# Patient Record
Sex: MALE | Race: WHITE | Employment: FULL TIME | URBAN - NONMETROPOLITAN AREA
[De-identification: names, ages, dates, MRNs, and addresses within clinical notes are randomized per-mention and may not be internally consistent; named-entity substitution may affect disease eponyms.]

---

## 2021-06-09 ENCOUNTER — HOSPITAL ENCOUNTER (EMERGENCY)
Age: 26
Discharge: HOME | End: 2021-06-09
Payer: OTHER GOVERNMENT

## 2021-06-09 VITALS
BODY MASS INDEX: 27.28 KG/M2 | DIASTOLIC BLOOD PRESSURE: 79 MMHG | HEART RATE: 73 BPM | TEMPERATURE: 97.2 F | HEIGHT: 68 IN | OXYGEN SATURATION: 94 % | SYSTOLIC BLOOD PRESSURE: 130 MMHG | RESPIRATION RATE: 16 BRPM | WEIGHT: 180 LBS

## 2021-06-09 DIAGNOSIS — S61.219A LACERATION OF FINGER OF LEFT HAND WITHOUT DAMAGE TO NAIL, FOREIGN BODY PRESENCE UNSPECIFIED, UNSPECIFIED FINGER, INITIAL ENCOUNTER: Primary | ICD-10-CM

## 2021-06-09 PROCEDURE — 2500000003 HC RX 250 WO HCPCS: Performed by: EMERGENCY MEDICINE

## 2021-06-09 PROCEDURE — 6370000000 HC RX 637 (ALT 250 FOR IP): Performed by: EMERGENCY MEDICINE

## 2021-06-09 PROCEDURE — 99203 OFFICE O/P NEW LOW 30 MIN: CPT

## 2021-06-09 PROCEDURE — 99203 OFFICE O/P NEW LOW 30 MIN: CPT | Performed by: EMERGENCY MEDICINE

## 2021-06-09 RX ORDER — LIDOCAINE HYDROCHLORIDE 10 MG/ML
5 INJECTION, SOLUTION INFILTRATION; PERINEURAL ONCE
Status: COMPLETED | OUTPATIENT
Start: 2021-06-09 | End: 2021-06-09

## 2021-06-09 RX ORDER — DIAPER,BRIEF,INFANT-TODD,DISP
EACH MISCELLANEOUS ONCE
Status: COMPLETED | OUTPATIENT
Start: 2021-06-09 | End: 2021-06-09

## 2021-06-09 RX ADMIN — BACITRACIN ZINC: 500 OINTMENT TOPICAL at 11:11

## 2021-06-09 RX ADMIN — LIDOCAINE HYDROCHLORIDE 5 ML: 10 INJECTION, SOLUTION INFILTRATION; PERINEURAL at 11:15

## 2021-06-09 ASSESSMENT — PAIN DESCRIPTION - LOCATION: LOCATION: FINGER (COMMENT WHICH ONE)

## 2021-06-09 ASSESSMENT — PAIN DESCRIPTION - PROGRESSION: CLINICAL_PROGRESSION: NOT CHANGED

## 2021-06-09 ASSESSMENT — PAIN SCALES - GENERAL: PAINLEVEL_OUTOF10: 3

## 2021-06-09 ASSESSMENT — ENCOUNTER SYMPTOMS
COUGH: 0
SHORTNESS OF BREATH: 0

## 2021-06-09 ASSESSMENT — PAIN DESCRIPTION - ORIENTATION: ORIENTATION: LEFT

## 2021-06-09 ASSESSMENT — PAIN DESCRIPTION - FREQUENCY: FREQUENCY: CONTINUOUS

## 2021-06-09 ASSESSMENT — PAIN DESCRIPTION - PAIN TYPE: TYPE: ACUTE PAIN

## 2021-06-09 ASSESSMENT — PAIN DESCRIPTION - ONSET: ONSET: SUDDEN

## 2021-06-09 ASSESSMENT — PAIN DESCRIPTION - DESCRIPTORS: DESCRIPTORS: ACHING

## 2021-06-09 NOTE — ED PROVIDER NOTES
[place] [address]  Urgent Care Encounter       CHIEF COMPLAINT       Chief Complaint   Patient presents with    Hand Laceration       Nurses Notes reviewed and I agree except as noted in the HPI. HISTORY OF PRESENT ILLNESS   Lloyd Crenshaw is a 31 y.o. male who presents for complaints of laceration to left little finger. Patient states he was working on a transmission of the vehicle. He states his wrench slipped and he cut his finger on a piece of metal on the underside of the engine. Bleeding is currently under control. Patient states last tetanus shot was 1 year ago. Reports he has full range of motion of his finger    HPI    REVIEW OF SYSTEMS     Review of Systems   Constitutional: Negative for fatigue and fever. Respiratory: Negative for cough and shortness of breath. Musculoskeletal: Negative for arthralgias. Skin: Positive for wound (laceration left little finger). Psychiatric/Behavioral: Negative for behavioral problems. PAST MEDICAL HISTORY   History reviewed. No pertinent past medical history. SURGICALHISTORY     Patient  has a past surgical history that includes LASIK. CURRENT MEDICATIONS       Previous Medications    No medications on file       ALLERGIES     Patient is has No Known Allergies. Patients   There is no immunization history on file for this patient. FAMILY HISTORY     Patient's family history includes Heart Attack in his mother. SOCIAL HISTORY     Patient  reports that he has never smoked. He has never used smokeless tobacco. He reports current alcohol use. He reports that he does not use drugs. PHYSICAL EXAM     ED TRIAGE VITALS  BP: 130/79, Temp: 97.2 °F (36.2 °C), Pulse: 73, Resp: 16, SpO2: 94 %,Estimated body mass index is 27.37 kg/m² as calculated from the following:    Height as of this encounter: 5' 8\" (1.727 m). Weight as of this encounter: 180 lb (81.6 kg). ,No LMP for male patient.     Physical Exam  Constitutional: General: He is not in acute distress. Appearance: He is normal weight. He is not ill-appearing. Pulmonary:      Effort: Pulmonary effort is normal.   Musculoskeletal:      Left hand: Laceration present. Hands:    Skin:     General: Skin is warm and dry. Neurological:      General: No focal deficit present. Mental Status: He is alert. Psychiatric:         Mood and Affect: Mood normal.         Behavior: Behavior normal.         DIAGNOSTIC RESULTS     Labs:No results found for this visit on 06/09/21. IMAGING:    No orders to display         EKG:      URGENT CARE COURSE:     Vitals:    06/09/21 1018   BP: 130/79   Pulse: 73   Resp: 16   Temp: 97.2 °F (36.2 °C)   TempSrc: Temporal   SpO2: 94%   Weight: 180 lb (81.6 kg)   Height: 5' 8\" (1.727 m)       Medications   lidocaine 1 % injection 5 mL (5 mLs Intradermal Given 6/9/21 1115)   bacitracin zinc ointment ( Topical Given 6/9/21 1111)            PROCEDURES:  Lac Repair    Date/Time: 6/9/2021 11:15 AM  Performed by: ZAINAB Wadsworth CNP  Authorized by: ZAINAB Wadsworth CNP     Consent:     Consent obtained:  Verbal    Consent given by:  Patient    Risks discussed:  Infection, need for additional repair, nerve damage, pain, poor cosmetic result, poor wound healing, retained foreign body, tendon damage and vascular damage    Alternatives discussed:  No treatment  Anesthesia (see MAR for exact dosages):      Anesthesia method:  Local infiltration    Local anesthetic:  Lidocaine 1% w/o epi  Laceration details:     Location:  Finger    Finger location:  L small finger    Length (cm):  2  Repair type:     Repair type:  Simple  Pre-procedure details:     Preparation:  Patient was prepped and draped in usual sterile fashion  Exploration:     Hemostasis achieved with:  Tourniquet    Wound exploration: wound explored through full range of motion and entire depth of wound probed and visualized      Wound extent: no foreign bodies/material noted, no muscle damage noted, no nerve damage noted, no tendon damage noted, no underlying fracture noted and no vascular damage noted      Contaminated: no    Treatment:     Area cleansed with:  Quirino-Clens    Amount of cleaning:  Standard    Visualized foreign bodies/material removed: no    Skin repair:     Repair method:  Sutures    Suture size:  4-0    Suture material:  Nylon    Suture technique:  Simple interrupted    Number of sutures:  4  Approximation:     Approximation:  Close  Post-procedure details:     Dressing:  Antibiotic ointment and non-adherent dressing    Patient tolerance of procedure: Tolerated well, no immediate complications        FINAL IMPRESSION      1. Laceration of finger of left hand without damage to nail, foreign body presence unspecified, unspecified finger, initial encounter          DISPOSITION/ PLAN     The patient has finger laceration. Repaired with simple interrupted sutures. No apparent tendon involvement. Sensation and cap refill intact. Patient will be discharged. Advised to wash hands couple times a day mild soap and water. Pat dry, apply antibiotic ointment. Sutures out 10 to 14 days. Follow-up with [facility] or return here. Advised to return for increased redness, swelling, drainage or any new concerns. PATIENT REFERRED TO:  No primary care provider on file. No primary physician on file. DISCHARGE MEDICATIONS:  New Prescriptions    No medications on file       Discontinued Medications    No medications on file       There are no discharge medications for this patient.       Adrian Skiff, APRN - CNP    (Please note that portions of this note were completed with a voice recognition program. Efforts were made to edit the dictations but occasionally words are mis-transcribed.)          Adrian Skiff, APRN - CNP  06/09/21 6214

## 2021-06-09 NOTE — ED NOTES
Pt discharged. Pt verbalized understanding of discharge instructions. Pt walked out per self in stable condition.      Amarilis Osullivan, MARYLU  89/92/14 [id number]

## 2021-06-09 NOTE — ED TRIAGE NOTES
To room with c/o laceration to left 5 th digit during class at [facility]. He was cut by transmission. No active bleeding. Skin flap covering laceration.

## 2021-09-13 ENCOUNTER — HOSPITAL ENCOUNTER (OUTPATIENT)
Age: 26
Discharge: HOME | End: 2021-09-13
Payer: OTHER GOVERNMENT

## 2021-09-13 LAB — SOURCE: NORMAL

## 2021-09-13 PROCEDURE — 87636 SARSCOV2 & INF A&B AMP PRB: CPT

## 2021-09-13 PROCEDURE — U0003 INFECTIOUS AGENT DETECTION BY NUCLEIC ACID (DNA OR RNA); SEVERE ACUTE RESPIRATORY SYNDROME CORONAVIRUS 2 (SARS-COV-2) (CORONAVIRUS DISEASE [COVID-19]), AMPLIFIED PROBE TECHNIQUE, MAKING USE OF HIGH THROUGHPUT TECHNOLOGIES AS DESCRIBED BY CMS-2020-01-R: HCPCS

## 2021-09-14 LAB
INFLUENZA A: NOT DETECTED
INFLUENZA B: NOT DETECTED
SARS-COV-2 RNA, RT PCR: NOT DETECTED

## 2022-05-27 ENCOUNTER — HOSPITAL ENCOUNTER (OUTPATIENT)
Age: 27
Discharge: HOME | End: 2022-05-27
Payer: OTHER GOVERNMENT

## 2022-05-27 ENCOUNTER — HOSPITAL ENCOUNTER (OUTPATIENT)
Dept: GENERAL RADIOLOGY | Age: 27
Discharge: HOME | End: 2022-05-27
Payer: OTHER GOVERNMENT

## 2022-05-27 DIAGNOSIS — M25.569 KNEE PAIN, UNSPECIFIED CHRONICITY, UNSPECIFIED LATERALITY: ICD-10-CM

## 2022-05-27 PROCEDURE — 73562 X-RAY EXAM OF KNEE 3: CPT

## 2022-06-16 ENCOUNTER — HOSPITAL ENCOUNTER (OUTPATIENT)
Dept: PHYSICAL THERAPY | Age: 27
Setting detail: THERAPIES SERIES
Discharge: HOME | End: 2022-06-16
Payer: OTHER GOVERNMENT

## 2022-06-16 PROCEDURE — 97530 THERAPEUTIC ACTIVITIES: CPT

## 2022-06-16 PROCEDURE — 97162 PT EVAL MOD COMPLEX 30 MIN: CPT

## 2022-06-16 NOTE — PROGRESS NOTES
** PLEASE SIGN, DATE AND TIME CERTIFICATION BELOW AND RETURN TO [facility] OUTPATIENT REHABILITATION (FAX #: [phone]). ATTEST/CO-SIGN IF ACCESSING VIA [employer]. THANK YOU.**    I certify that I have examined the patient below and determined that Physical Medicine and Rehabilitation service is necessary and that I approve the established plan of care for up to 90 days or as specifically noted. Attestation, signature or co-signature of physician indicates approval of certification requirements.    ________________________ ____________ __________  Physician Signature   Date   Time  [address] [facility]  PHYSICAL THERAPY  [x] EVALUATION  [] DAILY NOTE (LAND) [] DAILY NOTE (AQUATIC ) [] PROGRESS NOTE [] DISCHARGE NOTE    [x] [address]   [] [place] [address]    [] [facility]   [] [facility]    Date: 2022  Patient Name:  Zaki Hager  : 1995  MRN: [id number]  CSN: [id number]    Referring Practitioner Aisha    Diagnosis Pain in unspecified knee [M25.569]    Treatment Diagnosis B knee pain, B knee weakness, B knee stiffness, abnormal gait   Date of Evaluation 22    Additional Pertinent History Denies significant past medical history      Functional Outcome Measure Used LEFS   Functional Outcome Score 48/80 (22)       Insurance: Primary: Payor: Sid Esparza /  /  / ,   Secondary:    Authorization Information:  YES APPROVED FOR PT   Visit # 1/15, 1/10 for progress note   Visits Allowed: TOTAL OF 15 VISITS  FROM 22 TO 15/20/31,   Recertification Date: 2022   Physician Follow-Up:    Physician Orders: eval and treat   History of Present Illness: Pt is a 32 y/o male presenting to skilled PT services with c/o B knee pain. 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 Pain has been worseining within the last month or two.  Did have x-rays completed of both knees which were normal.      SUBJECTIVE: Pt not checked in until 30 min after arrival, but very understanding of miscommunication. Pt reports 2-3/10 B knee pain today, increases with activity up to 6-7/10. Social/Functional History and Current Status:  Medications and Allergies have been reviewed and are listed on Medical History Questionnaire. Rashida Kenney lives with a roommate in a single story home with a level surface to enter. Task Previous Current   ADLs  Independent Modified Independent   IADL's Independent Modified Independent   Ambulation Independent Modified Independent   Transfers Independent Modified Independent   Recreation Independent Modified Independent   Community Integration Independent Modified Independent   Driving Active  Active    Work Part-Time. Occupation:   Off Work Due to Injury. OBJECTIVE:    OBSERVATION    Comments   Pain 2-3/10 B knees   Posture Forward head, rounded shoulders   Palpation TTP superior patella B   Sensation B LE light touch sensation intact   Observation    Patellar Mobility Decreased mobility in sup/inf on L       LOWER EXTREMITY RANGE OF MOTION    Left Right Comments   Hip Flexion      Hip Extension      Hip ABDuction      Hip ADDuction      Hip Internal Rotation      Hip External Rotation      Hip Range of Motion is GLEN/[facility] [place]  [x]      Knee Flexion      Knee Extension Lacking 9 deg Lacking 4 deg    Knee Range of Motion is GLEN/[facility] [place]  []      Ankle Plantarflexion      Ankle Dorsiflexion      Ankle Inversion      Ankle Eversion      Ankle Range of Motion is [facility] [place]  [x]       LOWER EXTREMITY STRENGTH    Left Right Comments   Hip Flexion 4+ 4+    Hip Abduction      Hip Extension      Hip Adduction      Knee Flexion 4+ 4+    Knee Extension 4 4    Ankle DF 5 5    Ankle PF      Ankle Inversion      Ankle Eversion      LE strength is [place]/[facility] [place] []    SPECIAL TESTS (+/-)    Left Right Comments   Ant.  Drawer - -    Lachman's      Post. Drawer - -    Valgus Stress - -    Varus Stress - -    Yanet      Apley Beverli Peyer + +    90/90 hamstring length      Rafiq - -      GAIT ANALYSIS: no AD, pt demonstrates increased foot pronation and arch collapse, decreased stance time on L, decreased heel strike B, and decreased TKE    BALANCE/PROPRIOCEPTION          Single leg stance                                                      Left Right Comments   Eyes open                                 15+       Sec      15+   Sec painful       FUNCTIONAL TESTS    Pain No Pain Comments   Stair navigation x     Squat x  Quad dominat    Sit to Stand x         TREATMENT   Precautions:    Pain: 2-3/10 B knees    \"X in shaded column indicates activity completed today    *\" next to exercise/intervention indicates progression   Modalities Parameters/  Location  Notes                     Manual Therapy Time/Technique  Notes                     Exercise/Intervention   Notes   Bike              quad set with towel post to knee 10x5 sec  x    SLR 10x  x           HS stretch seated 3x30 sec  x    gastroc stretch with strap 3x30 sec  x    Seated quad stretch under chair 3x30 sec  x                         Gait education   x Heel toe pattern, knee ext, shoe orthotics     Specific Interventions Next Treatment: bike warmup to promote knee ext, B LE strengthening (TKE), LE stretching (quads, gastroc/solues, HS), gait training (TKE, heel strike), possible shoe insert for arch support, body mechanics, L patellar mobs, modalities PRN     Activity/Treatment Tolerance:  [x]  Patient tolerated treatment well  []  Patient limited by fatigue  []  Patient limited by pain   []  Patient limited by medical complications  []  Other:     Assessment:  Pt is a 30 y/o male presenting to skilled PT services with c/o B knee pain. Pt demonstrates B knee weakness, B knee stiffness, and abnormal gait, limiting his ability to ADLs and household tasks.  Pt will benefit from skilled PT services to increase strength, ROM, reduce pain, and improve overall gait mechanics to improve ability to perform functional mobility tasks for return to PLOF. Body Structures/Functions/Activity Limitations: impaired activity tolerance, impaired balance, impaired ROM, impaired strength, pain and abnormal gait  Prognosis: good    GOALS:  Patient Goal: reduce pain    Short Term Goals:  Time Frame: 3 weeks    1. Patient will report decrease in pain to 3/10 at most to allow ease of ADL's and household tasks. 2. Patient will increase R knee ext AROM to lacking 4 degrees to improve ability to ambulate with a normalized gait pattern. 3. Patient will increase B knee ext strength to 4+/5 to promote ease of ambulating with a normalized gait pattern. 4. Patient will ambulate with community distances without and AD while demonstrating B TKE, heel strike, and equal stance time to allow improved overall gait mechanics. 6. Patient will be indep with HEP in order to meet long term goals. Long Term Goals:  Time Frame: 6 weeks    1. Patient will be indep with HEP in order to prevent re-injury and improve ability to perform functional mobility tasks. 2. Patient will improve LEFS score from 48/80 to 55/80 to promote improved functional mobility and overall QOL. Patient Education:   [x]  HEP/Education Completed: Plan of Care, Goals, HO provided for HEP, pt to bring orthotics next visit, ambulate with heel toe pattern   [facility] Access Code: [facility]  []  No new Education completed  []  Reviewed Prior HEP      [x]  Patient verbalized and/or demonstrated understanding of education provided. []  Patient unable to verbalize and/or demonstrate understanding of education provided. Will continue education.   []  Barriers to learning:     PLAN:  Treatment Recommendations: Strengthening, Range of Motion, Functional Mobility Training, Gait Training, Stair Training, Manual Therapy - Soft Tissue Mobilization, Manual Therapy - Joint Manipulation, Pain Management, Home Exercise Program, Patient Education, Safety Education and Training and Modalities    [x]  Plan of care initiated. Plan to see patient 2 times per week for 6 weeks to address the treatment planned outlined above.   []  Continue with current plan of care  []  Modify plan of care as follows:    []  Hold pending physician visit  []  Discharge    Time In 1430   Time Out 1510   Timed Code Minutes: 10 min   Total Treatment Time: 40 min     Electronically Signed by: Maralee Klinefelter, [place] [id number]

## 2022-06-21 ENCOUNTER — HOSPITAL ENCOUNTER (OUTPATIENT)
Dept: PHYSICAL THERAPY | Age: 27
Setting detail: THERAPIES SERIES
Discharge: HOME | End: 2022-06-21
Payer: OTHER GOVERNMENT

## 2022-06-21 PROCEDURE — 97110 THERAPEUTIC EXERCISES: CPT

## 2022-06-21 NOTE — PROGRESS NOTES
[address] [facility]  PHYSICAL THERAPY  [] EVALUATION  [x] DAILY NOTE (LAND) [] DAILY NOTE (AQUATIC ) [] PROGRESS NOTE [] DISCHARGE NOTE    [x] OUTPATIENT REHABILITATION [facility]   [] [place] [address]    [] [facility]   [] Terrance Dance    Date: 2022  Patient Name:  Italia Morrow  : 1995  MRN: [id number]  CSN: [id number]    Referring Practitioner Carlee Benitez DO   Diagnosis Pain in unspecified knee [M25.569]    Treatment Diagnosis B knee pain, B knee weakness, B knee stiffness, abnormal gait   Date of Evaluation 22    Additional Pertinent History Denies significant past medical history      Functional Outcome Measure Used LEFS   Functional Outcome Score 48/80 (22)       Insurance: Primary: Payor: Marko Rivera /  /  / ,   Secondary:    Authorization Information:  YES APPROVED FOR PT   Visit # 2/15, 2/10 for progress note   Visits Allowed: TOTAL OF 15 VISITS  FROM 22 TO ,   Recertification Date: 2022   Physician Follow-Up:    Physician Orders: eval and treat   History of Present Illness: Pt is a 32 y/o male presenting to skilled PT services with c/o B knee pain. 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 Pain has been worseining within the last month or two. Did have x-rays completed of both knees which were normal.      SUBJECTIVE: Pt he has been off work for the lst few days so knees a not as sore. Reports /10 B knee pain. Has not been performing HEP as often as he would like due to being very busy and having a friend in from [place] of [place].        TREATMENT   Precautions:    Pain: 1/10 B knees    \"X in shaded column indicates activity completed today    *\" next to exercise/intervention indicates progression   Modalities Parameters/  Location  Notes                     Manual Therapy Time/Technique  Notes                     Exercise/Intervention   Notes   Bike* (S4, Level 4) 6 min x    Mat Exercises:       quad set with towel post to knee 10x5 sec  x    SLR 10x  x    Prone squad stretch with strap* 3x20 sec      HS stretch seated 3x30 sec  x    gastroc stretch with strap 2x30 sec  x    Seated quad stretch under chair - on R only 3x30 sec  x    Standing:       TKE with ball at popliteal region    Next session          Gait education    Heel toe pattern, knee ext, shoe orthotics     Specific Interventions Next Treatment: bike warmup to promote knee ext, B LE strengthening (TKE), LE stretching (quads, gastroc/solues, HS), gait training (TKE, heel strike), possible shoe insert for arch support, body mechanics, L patellar mobs, modalities PRN     Activity/Treatment Tolerance:  [x]  Patient tolerated treatment well  []  Patient limited by fatigue  []  Patient limited by pain   []  Patient limited by medical complications  []  Other:     Assessment:  Continued with therex as documented above. Progression made as indicated by (*) in tx grid. Bike performed as warm up with pt reporting tightness with knee ext. Continues to demonstrate decreased L knee ext compared to R. B quad weakness with SLR. Reported slight increase in B knees at end of session. GOALS:  Patient Goal: reduce pain    Short Term Goals:  Time Frame: 3 weeks    1. Patient will report decrease in pain to 3/10 at most to allow ease of ADL's and household tasks. 2. Patient will increase R knee ext AROM to lacking 4 degrees to improve ability to ambulate with a normalized gait pattern. 3. Patient will increase B knee ext strength to 4+/5 to promote ease of ambulating with a normalized gait pattern. 4. Patient will ambulate with community distances without and AD while demonstrating B TKE, heel strike, and equal stance time to allow improved overall gait mechanics. 6. Patient will be indep with HEP in order to meet long term goals. Long Term Goals:  Time Frame: 6 weeks    1.  Patient will be indep with HEP in order to

## 2022-06-24 ENCOUNTER — HOSPITAL ENCOUNTER (OUTPATIENT)
Dept: PHYSICAL THERAPY | Age: 27
Setting detail: THERAPIES SERIES
Discharge: HOME | End: 2022-06-24
Payer: OTHER GOVERNMENT

## 2022-06-24 PROCEDURE — 97110 THERAPEUTIC EXERCISES: CPT

## 2022-06-24 NOTE — PROGRESS NOTES
[address] [facility]  PHYSICAL THERAPY  [] EVALUATION  [x] DAILY NOTE (LAND) [] DAILY NOTE (AQUATIC ) [] PROGRESS NOTE [] DISCHARGE NOTE    [x] OUTPATIENT REHABILITATION [facility]   [] [place] [address]    [] [facility]   [] Latricia Sloanworth    Date: 2022  Patient Name:  Renetta Reddy  : 1995  MRN: [id number]  CSN: [id number]    Referring Practitioner Param Blanco DO   Diagnosis Pain in unspecified knee [M25.569]    Treatment Diagnosis B knee pain, B knee weakness, B knee stiffness, abnormal gait   Date of Evaluation 22    Additional Pertinent History Denies significant past medical history      Functional Outcome Measure Used LEFS   Functional Outcome Score 48/80 (22)       Insurance: Primary: Payor: [employer] /  /  / ,   Secondary:    Authorization Information:  YES APPROVED FOR PT; TOTAL OF 15 VISITS FROM 22 TO 10/14/22, Teagan Pollard. #[id number]   Visit # 3/15, 3/10 for progress note   Visits Allowed: TOTAL OF 15 VISITS  FROM 22 TO ,   Recertification Date: 2022   Physician Follow-Up:    Physician Orders: eval and treat   History of Present Illness: Pt is a 30 y/o male presenting to skilled PT services with c/o B knee pain. 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 Pain has been worseining within the last month or two. Did have x-rays completed of both knees which were normal.      SUBJECTIVE:  Patient reports he felt good after last therapy session. Admits he isn't completing HEP as often as he would like because he has difficulty fitting it in his schedule. Patient states he has orthotics for his work boots.     TREATMENT   Precautions:    Pain: .5-1/10 Left knee    \"X in shaded column indicates activity completed today    *\" next to exercise/intervention indicates progression   Modalities Parameters/  Location  Notes                     Manual Therapy Time/Technique  Notes Patellar mobs 3 minutes X Demonstrated and patient performed for HEP               Exercise/Intervention   Notes   Matrix Bike (Seat 4) 6 min Level 4 X           Mat Exercises:       quad set with towel post to knee 10x5 sec      SLR 15x*  X    Sidelying hip adduction and abduction* 10x  X    Prone hip extension* 10x  X           Prone squad stretch with strap 3x20 sec      HS stretch seated 3x30 sec      gastroc stretch with strap 2x30 sec      Seated quad stretch under chair - on R only 3x30 sec  X           Standing:       Stretches at steps:  Quads, hamstrings, calves* 3x 20 sec  X    TKE with ball at popliteal region* 10x 5 sec  X    Shallow wall squats with ball between knees    Next session                        Gait education    Heel toe pattern, knee ext, shoe orthotics     Specific Interventions Next Treatment: bike warmup to promote knee ext, B LE strengthening (TKE), LE stretching (quads, gastroc/solues, HS), gait training (TKE, heel strike), possible shoe insert for arch support, body mechanics, L patellar mobs, modalities PRN     Activity/Treatment Tolerance:  [x]  Patient tolerated treatment well []  Patient limited by fatigue  []  Patient limited by pain   []  Patient limited by medical complications  []  Other:     Assessment:  Continued with therex as documented with progressions indicated by (*) in grid. Patient tolerated well and voiced pain was 1/10 at conclusion of therapy session. Quad weakness noted and fatigue with SLR. GOALS:  Patient Goal: reduce pain    Short Term Goals:  Time Frame: 3 weeks    1. Patient will report decrease in pain to 3/10 at most to allow ease of ADL's and household tasks. 2. Patient will increase R knee ext AROM to lacking 4 degrees to improve ability to ambulate with a normalized gait pattern. 3. Patient will increase B knee ext strength to 4+/5 to promote ease of ambulating with a normalized gait pattern.   4. Patient will ambulate with community distances without and AD while demonstrating B TKE, heel strike, and equal stance time to allow improved overall gait mechanics. 6. Patient will be indep with HEP in order to meet long term goals. Long Term Goals:  Time Frame: 6 weeks    1. Patient will be indep with HEP in order to prevent re-injury and improve ability to perform functional mobility tasks. 2. Patient will improve LEFS score from 48/80 to 55/80 to promote improved functional mobility and overall QOL. Patient Education:   [x]  HEP/Education Completed: Continue HEP, monitor pain after progressions   [employer] Access Code: [facility]  []  No new Education completed  []  Reviewed Prior HEP      [x]  Patient verbalized and/or demonstrated understanding of education provided. []  Patient unable to verbalize and/or demonstrate understanding of education provided. Will continue education. []  Barriers to learning:     PLAN:  Treatment Recommendations: Strengthening, Range of Motion, Functional Mobility Training, Gait Training, Stair Training, Manual Therapy - Soft Tissue Mobilization, Manual Therapy - Joint Manipulation, Pain Management, Home Exercise Program, Patient Education, Safety Education and Training and Modalities    []  Plan of care initiated. Plan to see patient 2 times per week for 6 weeks to address the treatment planned outlined above.   [x]  Continue with current plan of care  []  Modify plan of care as follows:    []  Hold pending physician visit  []  Discharge    Time In 0915   Time Out 0950   Timed Code Minutes: 35 min   Total Treatment Time: 35 min     Electronically Signed by: Radha Christian PTA

## 2022-06-29 ENCOUNTER — HOSPITAL ENCOUNTER (OUTPATIENT)
Dept: PHYSICAL THERAPY | Age: 27
Setting detail: THERAPIES SERIES
Discharge: HOME | End: 2022-06-29
Payer: OTHER GOVERNMENT

## 2022-06-29 PROCEDURE — 97110 THERAPEUTIC EXERCISES: CPT

## 2022-06-29 NOTE — PROGRESS NOTES
[address] [facility]  PHYSICAL THERAPY  [] EVALUATION  [x] DAILY NOTE (LAND) [] DAILY NOTE (AQUATIC ) [] PROGRESS NOTE [] DISCHARGE NOTE    [x] OUTPATIENT REHABILITATION CENTER [facility]   [] [place] [address]    [] [facility]   [] Dorie Corinpalmira    Date: 2022   Patient Name:  Maxwell Ramsey  \"Riggs\"    : 1995    MRN: [id number]   CSN: [id number]    Referring Practitioner Dylan Granda DO   Diagnosis Pain in unspecified knee [M25.569]    Treatment Diagnosis B knee pain, B knee weakness, B knee stiffness, abnormal gait   Date of Evaluation 22    Additional Pertinent History Denies significant past medical history      Functional Outcome Measure Used LEFS   Functional Outcome Score 48/80 (22)       Insurance: Primary: Payor: Jace Olszewski /  /  / ,   Secondary:    Authorization Information:  YES APPROVED FOR PT; TOTAL OF 15 VISITS FROM 22 TO 10/14/22, Edith Chirinos. #[id number]   Visit # 4/15, 4/10 for progress note   Visits Allowed: TOTAL OF 15 VISITS  FROM 22 TO ,   Recertification Date: 2022   Physician Follow-Up:    Physician Orders: eval and treat   History of Present Illness: Pt is a 32 y/o male presenting to skilled PT services with c/o B knee pain. 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 Pain has been worseining within the last month or two. Did have x-rays completed of both knees which were normal.      SUBJECTIVE: Patient reports that he performs his exercises when he has time. He is having . 5-1/10 pain at his left knee for the \"most part\" today.        TREATMENT   Precautions:    Pain: .5-1/10 Left knee    \"X in shaded column indicates activity completed today    *\" next to exercise/intervention indicates progression   Modalities Parameters/  Location  Notes                     Manual Therapy Time/Technique  Notes   Patellar mobs: up/down, lateral  10x ea B  X Patient performed today Exercise/Intervention   Notes   Matrix Bike (Seat 4) 6 min Level 4 X 5 min performed today          Mat Exercises:       quad set with towel post to knee 10x5 sec  X    SLR 15x  X    Sidelying hip adduction and abduction 10x  X    Prone hip extension 10x  X           Prone squad stretch with strap 3x20 sec      HS stretch seated 3x30 sec      gastroc stretch with strap 2x30 sec      Seated quad stretch under chair - on R only 3x30 sec  X Held today due to increasing pain           Standing:       Stretches at steps: Quads, hamstrings, calves 3x 20 sec  X Only 1 set performed with quads due to not feeling a stretch    TKE with ball at popliteal region 10x 5 sec  X More difficulty noted at LLE    Shallow wall squats with ball between knees * 5x5 sec   X Noted pain a right knee    TG squats * 10x  X    Step ups: forward/lateral * 10x ea B  6 inch  X           Gait education    Heel toe pattern, knee ext, shoe orthotics     Specific Interventions Next Treatment: bike warmup to promote knee ext, B LE strengthening (TKE), LE stretching (quads, gastroc/solues, HS), gait training (TKE, heel strike), possible shoe insert for arch support, body mechanics, L patellar mobs, modalities PRN     Activity/Treatment Tolerance:  [x]  Patient tolerated treatment well []  Patient limited by fatigue  []  Patient limited by pain   []  Patient limited by medical complications  []  Other:     Assessment: Patient continued with strengthening exercises as documented above. Additional exercises added this session as shown in the exercise grid. He was provided with demonstration and cues on proper technique with added exercises to ensure maximal muscle activation. Patient did not note much of a quad stretch with stretch at steps so performed 1 set today. Patient having increased pain at his right knee with wall squats so only performed 5 reps. Patient having improved tolerance with squats at the total gym.  Weakness noted at LEs while patient performed SLR exercise. Patient reported that his pain was a 1/10 during mat table exercises and his left knee increased to a 3/10 and right knee to a 2/10 with walking at the end of session. GOALS:  Patient Goal: reduce pain    Short Term Goals:  Time Frame: 3 weeks    1. Patient will report decrease in pain to 3/10 at most to allow ease of ADL's and household tasks. 2. Patient will increase R knee ext AROM to lacking 4 degrees to improve ability to ambulate with a normalized gait pattern. 3. Patient will increase B knee ext strength to 4+/5 to promote ease of ambulating with a normalized gait pattern. 4. Patient will ambulate with community distances without and AD while demonstrating B TKE, heel strike, and equal stance time to allow improved overall gait mechanics. 6. Patient will be indep with HEP in order to meet long term goals. Long Term Goals:  Time Frame: 6 weeks    1. Patient will be indep with HEP in order to prevent re-injury and improve ability to perform functional mobility tasks. 2. Patient will improve LEFS score from 48/80 to 55/80 to promote improved functional mobility and overall QOL. Patient Education:   [x]  HEP/Education Completed: Added exercises, updated HEP provided.  [employer] Access Code: [facility]  []  No new Education completed  [x]  Reviewed Prior HEP      [x]  Patient verbalized and/or demonstrated understanding of education provided. []  Patient unable to verbalize and/or demonstrate understanding of education provided. Will continue education. []  Barriers to learning:     PLAN:  Treatment Recommendations: Strengthening, Range of Motion, Functional Mobility Training, Gait Training, Stair Training, Manual Therapy - Soft Tissue Mobilization, Manual Therapy - Joint Manipulation, Pain Management, Home Exercise Program, Patient Education, Safety Education and Training and Modalities    []  Plan of care initiated.   Plan to see patient 2 times per week for 6 weeks to address the treatment planned outlined above.   [x]  Continue with current plan of care  []  Modify plan of care as follows:    []  Hold pending physician visit  []  Discharge    Time In 1514   Time Out 1555   Timed Code Minutes:  41 min   Total Treatment Time:  41 min     Electronically Signed by: Saqib Garcia PTA

## 2022-06-29 NOTE — PROGRESS NOTES
mouth in the morning: {YES/NO:19726}. History of palpitations during night time/nocturnal awakenings: {YES/NO:19726}. History of sweating during night time/nocturnal awakenings: {YES/NO:19726}. General:  History of head injury in the past: {YES/NO:19726}. History of seizures: No  Rest less legs syndrome symptoms:NO  History suggestive of periodic limb movements during sleep: NO  History suggestive of hypnagogic hallucinations: NO  History suggestive of hypnopompic hallucinations: NO  History suggestive of sleep talking:NO  History suggestive of sleep walking:NO  History suggestive of bruxism: NO  [] No mouth guard. [] Uses mouth guard. History suggestive of cataplexy: NO  History suggestive of sleep paralysis: NO    Family history of sleep disorders:  Family history of obstructive sleep apnea: {YES/NO:19726}. Family history of Narcolepsy: {YES/NO:19726}. Family history of Rest less legs syndrome : {YES/NO:19726}. History regarding old sleep studies:  Prior history of sleep study: {YES/NO:19726}. Using CPAP device: {YES/NO:19726}. Currently using home Oxygen: NO.       Patient considerations:  Is the patient is ambulatory: Yes  Patient is currently using: None of these Wheelchair, Will Shay Masterson. Para/Quadriplegic: NO  Hearing deficit : NO  Claustrophobic: NO  MDD : NO  Blind: NO  Incontinent: NO  Para/Quadraplegi: NO.   Need transportation to and from Sleep Center:NO      Social History:  Social History     Tobacco Use    Smoking status: Never Smoker    Smokeless tobacco: Never Used   Substance Use Topics    Alcohol use: Yes     Comment: weekend    Drug use: Never   . He is currently working: {YES/NO:19726}. [] Retired. []Disabled     He usually goes to his work at:   He completes his work at:                           No past medical history on file.     Past Surgical History:   Procedure Laterality Date    LASIK         No Known Allergies    No current outpatient medications on file.     No current facility-administered medications for this visit. Family History   Problem Relation Age of Onset    Heart Attack Mother         Review of Systems:   General/Constitutional: No recent loss of weight or appetite changes. No fever or chills. HENT: Negative. Eyes: Negative. Upper respiratory tract: No nasal stuffiness or post nasal drip. Lower respiratory tract/ lungs: No cough or sputum production. No hemoptysis. Cardiovascular: No palpitations or chest pain. Gastrointestinal: No nausea or vomiting. Neurological: No focal neurologiacal weakness. Extremities: No edema. Musculoskeletal: No complaints. Genitourinary: No complaints. Hematological: Negative. Psychiatric/Behavioral: Negative. Skin: No itching. There were no vitals taken for this visit. BMI:  There is no height or weight on file to calculate BMI. Physical Exam:   Nursing note and vitals reviewed. Constitutional: Patient appears moderately built and moderately nourished. No distress. Patient is oriented to person, place, and time. HENT:   Head: Normocephalic and atraumatic. Right Ear: External ear normal.   Left Ear: External ear normal.   Mouth/Throat: Oropharynx is clear and moist.  No oral thrush. Eyes: Conjunctivae are normal. Pupils are equal, round, and reactive to light. No scleral icterus. Neck: Neck supple. No JVD present. No tracheal deviation present. Cardiovascular: Normal rate, regular rhythm, normal heart sounds. No murmur heard. Pulmonary/Chest: Effort normal and breath sounds normal. No stridor. No respiratory distress. No wheezes. No rales. Patient exhibits no tenderness. Abdominal: Soft. Patient exhibits no distension. No tenderness. Musculoskeletal: Normal range of motion. Extremities: Patient exhibits no edema and no tenderness. Lymphadenopathy:  No cervical adenopathy. Neurological: Patient is alert and oriented to person, place, and time.    Skin: Skin is warm and dry. Patient is not diaphoretic. Psychiatric: Patient  has a normal mood and affect. Patient behavior is normal.     Diagnostic Data:  None related sleep. Assessment:  -Snoring {Desc; with/without:5700} witnessed apneas,frequent nocturnal awakenings and excessive daytime sleepiness to evaluate for obstructive sleep apnea. -Inadequate sleep hygiene. No past medical history on file. Recommendations/Plan:  -Will schedule patient for polysomnogram in the sleep lab. -I had a discussion with patient regarding avialable treatment options for his sleep disorder breathing including but not limited to CPAP titration in the sleep lab Vs.Dental appliance placement with referral to a local dentist Vs other available surgical options including Uvulopalatopharyngoplasty, maxillomandibular ostomy and tracheostomy as last option. At the end of discussion, he is not decided on his   treatment if he found to have obstructive sleep apnea at this time.  -We will see Ajithgallo Burgess back in 1week after the sleep study to go over the sleep study results and further management options.  -He was educated to practice good sleep hygiene practices. He  was provided with a good sleep hygiene hand out.  -Alf Mckeon was advised to make earlier appointment with my clinic if he develops any worsening of sleep symptoms. He verbalizes understanding.  -Alf Mckeon was advised to not to drive any motor vehicles or operate heavy equipment until his sleep symptoms are under good control. Ric Burgess verbalizes understanding.  -He was advised to loose weight by controlling diet and doing exercise once cleared by his family physician. - Ric Burgess was educated about my impression and plan.  He verbalizes understanding.      -I personally reviewed updated the Past medical hx, Past surgical hx,Social hx, Family hx, Medications, Allergies in the discrete data section of the patient chart along with labs, Pulmonary medicine,Sleep medicine related, Pathological, Microbiological and Radiological investigations.

## 2022-07-01 ENCOUNTER — HOSPITAL ENCOUNTER (OUTPATIENT)
Dept: PHYSICAL THERAPY | Age: 27
Setting detail: THERAPIES SERIES
Discharge: HOME | End: 2022-07-01
Payer: OTHER GOVERNMENT

## 2022-07-01 PROCEDURE — 97110 THERAPEUTIC EXERCISES: CPT

## 2022-07-01 NOTE — PROGRESS NOTES
[address] [facility]  PHYSICAL THERAPY  [] EVALUATION  [x] DAILY NOTE (LAND) [] DAILY NOTE (AQUATIC ) [] PROGRESS NOTE [] DISCHARGE NOTE    [x] OUTPATIENT REHABILITATION [facility]   [] [place] [address]    [] [facility]   [] Kenney Acharya     Date: 2022   Patient Name:  Marilee Ace  \"Riggs\"    : 1995    MRN: [id number]   CSN: [id number]    Referring Practitioner Kike Ibarra DO   Diagnosis Pain in unspecified knee [M25.569]    Treatment Diagnosis B knee pain, B knee weakness, B knee stiffness, abnormal gait   Date of Evaluation 22    Additional Pertinent History Denies significant past medical history      Functional Outcome Measure Used LEFS   Functional Outcome Score 48/80 (22)       Insurance: Primary: Payor: Jimi Salazar /  /  / ,   Secondary:    Authorization Information:  YES APPROVED FOR PT; TOTAL OF 15 VISITS FROM 22 TO 10/14/22, Marcia Marin. #[id number]   Visit # 5/15, 5/10 for progress note   Visits Allowed: TOTAL OF 15 VISITS  FROM 22 TO 94/71/38,   Recertification Date: 2022   Physician Follow-Up:    Physician Orders: eval and treat   History of Present Illness: Pt is a 30 y/o male presenting to skilled PT services with c/o B knee pain. 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 Pain has been worseining within the last month or two. Did have x-rays completed of both knees which were normal.      SUBJECTIVE: Patient states that yesterday his pain levels were elevated. He is unsure if it was from his therapy session or what caused his elevated pain. He rates his current knee pain a 0.5-1/10. He reports that his left knee hurts worse than his right knee.        TREATMENT   Precautions:    Pain: .5-1/10 Left knee> right knee    \"X in shaded column indicates activity completed today    *\" next to exercise/intervention indicates progression   Modalities Parameters/  Location  Notes Manual Therapy Time/Technique  Notes   Patellar mobs: up/down, lateral  10x ea B   Patient performed today               Exercise/Intervention   Notes   Matrix Bike (Seat 4) 6 min Level 4 X Level 3 today          Mat Exercises:       Quad set with towel post to knee 12*x5 sec  X    SLR 15x  X    Sidelying hip adduction and abduction 10x  X    Prone hip extension 10x  X           Prone squad stretch with strap 3x20 sec  X    HS stretch seated 3x30 sec      gastroc stretch with strap 2x30 sec      Seated quad stretch under chair - on R only 3x30 sec   Held today due to increasing pain           Standing:       Stretches at steps: Quads, hamstrings, calves 3x 20 sec  X Held quad stretch at steps due to patient not feeling a stretch   TKE with ball at popliteal region 12*x 5 sec  X More difficulty noted at LLE    Shallow wall squats with ball between knees  5x2 sec   X Decreased hold time to assist with discomfort   TG squats  10x  X    Step ups: forward/lateral  10x ea B  6 inch  X    Eccentric heel taps-bilateral * 10x Off edge of // bars  X    3 way hip kicks-bilateral * 10x  X    Standing HS curls *  10x  X           Gait education    Heel toe pattern, knee ext, shoe orthotics     Specific Interventions Next Treatment: bike warmup to promote knee ext, B LE strengthening (TKE), LE stretching (quads, gastroc/solues, HS), gait training (TKE, heel strike), possible shoe insert for arch support, body mechanics, L patellar mobs, modalities PRN     Activity/Treatment Tolerance:  [x]  Patient tolerated treatment well []  Patient limited by fatigue  []  Patient limited by pain   []  Patient limited by medical complications  []  Other:     Assessment: Continued to progress with strengthening exercises as shown in exercise grid above. He was provided with demonstration and cues on proper technique with added exercises to ensure maximal muscle activation.  Patient denied any increase in his pain while completing standing exercises this session but noted that it took \"effort\" to complete the exercises. He reported that he was feeling okay at the conclusion of session. GOALS:  Patient Goal: reduce pain    Short Term Goals:  Time Frame: 3 weeks    1. Patient will report decrease in pain to 3/10 at most to allow ease of ADL's and household tasks. 2. Patient will increase R knee ext AROM to lacking 4 degrees to improve ability to ambulate with a normalized gait pattern. 3. Patient will increase B knee ext strength to 4+/5 to promote ease of ambulating with a normalized gait pattern. 4. Patient will ambulate with community distances without and AD while demonstrating B TKE, heel strike, and equal stance time to allow improved overall gait mechanics. 6. Patient will be indep with HEP in order to meet long term goals. Long Term Goals:  Time Frame: 6 weeks    1. Patient will be indep with HEP in order to prevent re-injury and improve ability to perform functional mobility tasks. 2. Patient will improve LEFS score from 48/80 to 55/80 to promote improved functional mobility and overall QOL. Patient Education:   [x]  HEP/Education Completed: Added exercises, updated HEP provided.  [employer] Access Code: [facility]  []  No new Education completed  [x]  Reviewed Prior HEP      [x]  Patient verbalized and/or demonstrated understanding of education provided. []  Patient unable to verbalize and/or demonstrate understanding of education provided. Will continue education. []  Barriers to learning:     PLAN:  Treatment Recommendations: Strengthening, Range of Motion, Functional Mobility Training, Gait Training, Stair Training, Manual Therapy - Soft Tissue Mobilization, Manual Therapy - Joint Manipulation, Pain Management, Home Exercise Program, Patient Education, Safety Education and Training and Modalities    []  Plan of care initiated.   Plan to see patient 2 times per week for 6 weeks to address the treatment planned outlined above.   [x]  Continue with current plan of care  []  Modify plan of care as follows:    []  Hold pending physician visit  []  Discharge    Time In 0855   Time Out 0937   Timed Code Minutes: 42 min   Total Treatment Time:  42 min     Electronically Signed by: Clinton Johnson PTA

## 2022-07-05 ENCOUNTER — HOSPITAL ENCOUNTER (OUTPATIENT)
Dept: PHYSICAL THERAPY | Age: 27
Setting detail: THERAPIES SERIES
Discharge: HOME | End: 2022-07-05
Payer: OTHER GOVERNMENT

## 2022-07-05 PROCEDURE — 97110 THERAPEUTIC EXERCISES: CPT

## 2022-07-05 NOTE — PROGRESS NOTES
[address] [facility]  PHYSICAL THERAPY  [] EVALUATION  [x] DAILY NOTE (LAND) [] DAILY NOTE (AQUATIC ) [] PROGRESS NOTE [] DISCHARGE NOTE    [x] OUTPATIENT REHABILITATION [facility]   [] [place] [address]    [] [facility]   [] Severiano Stapler     Date: 2022   Patient Name:  Cody Bearden  \"Oneil\"    : 1995    MRN: [id number]   CSN: [id number]    Referring Practitioner Hollis Aguirre DO   Diagnosis Pain in unspecified knee [M25.569]    Treatment Diagnosis B knee pain, B knee weakness, B knee stiffness, abnormal gait   Date of Evaluation 22    Additional Pertinent History Denies significant past medical history      Functional Outcome Measure Used LEFS   Functional Outcome Score 48/80 (22)       Insurance: Primary: Payor: Aron Ovalles /  /  / ,   Secondary:    Authorization Information:  YES APPROVED FOR PT; TOTAL OF 15 VISITS FROM 22 TO 10/14/22, Donavon Gonzalez. #[id number]   Visit # 6/15, 6/10 for progress note   Visits Allowed: TOTAL OF 15 VISITS  FROM 22 TO 15/94/71,   Recertification Date: 2022   Physician Follow-Up:    Physician Orders: eval and treat   History of Present Illness: Pt is a 30 y/o male presenting to skilled PT services with c/o B knee pain. 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 Pain has been worsening within the last month or two. Did have x-rays completed of both knees which were normal.      SUBJECTIVE: Patient reporting left knee 1/10 and right knee 2/10 and reporting no other complains at this time.       TREATMENT   Precautions:    Pain: 1/10 Left knee 2/10 right knee    \"X in shaded column indicates activity completed today    *\" next to exercise/intervention indicates progression   Modalities Parameters/  Location  Notes                     Manual Therapy Time/Technique  Notes   Patellar mobs: up/down, lateral  10x ea B   Patient performed today will increase R knee ext AROM to lacking 4 degrees to improve ability to ambulate with a normalized gait pattern. 3. Patient will increase B knee ext strength to 4+/5 to promote ease of ambulating with a normalized gait pattern. 4. Patient will ambulate with community distances without and AD while demonstrating B TKE, heel strike, and equal stance time to allow improved overall gait mechanics. 6. Patient will be indep with HEP in order to meet long term goals. Long Term Goals:  Time Frame: 6 weeks    1. Patient will be indep with HEP in order to prevent re-injury and improve ability to perform functional mobility tasks. 2. Patient will improve LEFS score from 48/80 to 55/80 to promote improved functional mobility and overall QOL. Patient Education:   [x]  HEP/Education Completed: added rocker board.  [employer] Access Code: [facility]  []  No new Education completed  [x]  Reviewed Prior HEP      [x]  Patient verbalized and/or demonstrated understanding of education provided. []  Patient unable to verbalize and/or demonstrate understanding of education provided. Will continue education. []  Barriers to learning:     PLAN:  Treatment Recommendations: Strengthening, Range of Motion, Functional Mobility Training, Gait Training, Stair Training, Manual Therapy - Soft Tissue Mobilization, Manual Therapy - Joint Manipulation, Pain Management, Home Exercise Program, Patient Education, Safety Education and Training and Modalities     Plan to see patient 2 times per week for 6 weeks to address the treatment planned outlined above.   [x]  Continue with current plan of care  []  Modify plan of care as follows:    []  Hold pending physician visit  []  Discharge    Time In 1403   Time Out 1429   Timed Code Minutes: 26 min   Total Treatment Time:  26 min     Electronically Signed by: Ivanna Solo PTA

## 2022-07-08 ENCOUNTER — HOSPITAL ENCOUNTER (OUTPATIENT)
Dept: PHYSICAL THERAPY | Age: 27
Setting detail: THERAPIES SERIES
Discharge: HOME | End: 2022-07-08
Payer: OTHER GOVERNMENT

## 2022-07-08 PROCEDURE — 97110 THERAPEUTIC EXERCISES: CPT

## 2022-07-08 NOTE — PROGRESS NOTES
[facility]  PHYSICAL THERAPY  [] EVALUATION  [x] DAILY NOTE (LAND) [] DAILY NOTE (AQUATIC ) [] PROGRESS NOTE [] DISCHARGE NOTE    [x] OUTPATIENT REHABILITATION [facility]   [] [place] [address]    [] [facility]   [] [facility]     Date: 2022    Patient Name:  Milvia Colon  \"Riggs\"    : 1995    MRN: [id number]   CSN: [id number]    Referring Practitioner Constanza Huff DO   Diagnosis Pain in unspecified knee [M25.569]    Treatment Diagnosis B knee pain, B knee weakness, B knee stiffness, abnormal gait   Date of Evaluation 22    Additional Pertinent History Denies significant past medical history      Functional Outcome Measure Used LEFS   Functional Outcome Score 48/80 (22)       Insurance: Primary: Payor: Rhiannon Felix /  /  / ,   Secondary:    Authorization Information:  YES APPROVED FOR PT; TOTAL OF 15 VISITS FROM 22 TO 10/14/22, Pipo Mosley. #[id number]   Visit # 7/15, 7/10 for progress note   Visits Allowed: TOTAL OF 15 VISITS  FROM 22 TO 75/77/83,   Recertification Date: 2022   Physician Follow-Up:    Physician Orders: eval and treat   History of Present Illness: Pt is a 32 y/o male presenting to skilled PT services with c/o B knee pain. 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 Pain has been worsening within the last month or two. Did have x-rays completed of both knees which were normal.      SUBJECTIVE: Patient states he just just woke up and isn't having much pain. Reports he is doing HEP as he is able. Patient admits he is usually a little sore after therapy.        TREATMENT   Precautions:    Pain: 1/10 Left knee, 0/10 right knee    \"X in shaded column indicates activity completed today    *\" next to exercise/intervention indicates progression   Modalities Parameters/  Location  Notes                     Manual Therapy Time/Technique  Notes   Patellar mobs: up/down, lateral  10x ea B   Patient performed today               Exercise/Intervention   Notes   Matrix Bike (Seat 4) 6 min Level 4     SportsArt Bike (Seat 4) 6 min Level 4 X           Mat Exercises:       Quad set with towel post to knee 12x5 sec      SLR 15x      Side lying hip adduction and abduction 10x      Prone hip extension 10x             Prone squad stretch with strap 3x20 sec      HS stretch seated 3x30 sec      gastroc stretch with strap 2x30 sec      Seated quad stretch under chair - on R only 3x30 sec   Held today due to increasing pain           Standing:       Stretches at steps: Hamstrings, calves 3x 20 sec  X    TKE with ball at popliteal region 15*x 5 sec  X More difficulty noted at LLE    Shallow wall squats with ball between knees  5x2 sec    Decreased hold time to assist with discomfort   TG squats with ball  2x10*  X    Step ups: forward/lateral  15*x ea B  6 inch  X    Eccentric heel taps-bilateral  15*x Off edge of // bars  X    3 way hip kicks-bilateral  15*x  X    Standing HS curls  15*x  X    Rocker Board (2 directions) 20*x 1 minute* balance X    Gait education    Heel toe pattern, knee ext, shoe orthotics     Specific Interventions Next Treatment: bike warmup to promote knee ext, B LE strengthening (TKE), LE stretching (quads, gastroc/soleus, HS), gait training (TKE, heel strike), possible shoe insert for arch support, body mechanics, L patellar mobs, modalities PRN     Activity/Treatment Tolerance:  [x]  Patient tolerated treatment well []  Patient limited by fatigue  []  Patient limited by pain   []  Patient limited by medical complications  []  Other:     Assessment:  Increased repetitions with most activities today. Patient tolerated well. Mild muscle soreness noted at conclusion of therapy session with Left knee rated 1/10 and no pain in Right knee. Instructed patient to use heat or ice if needed for muscle soreness/pain.        GOALS:  Patient Goal: reduce pain    Short Term Goals:  Time Frame: 3 weeks    1. Patient will report decrease in pain to 3/10 at most to allow ease of ADL's and household tasks. 2. Patient will increase R knee ext AROM to lacking 4 degrees to improve ability to ambulate with a normalized gait pattern. 3. Patient will increase B knee ext strength to 4+/5 to promote ease of ambulating with a normalized gait pattern. 4. Patient will ambulate with community distances without and AD while demonstrating B TKE, heel strike, and equal stance time to allow improved overall gait mechanics. 6. Patient will be indep with HEP in order to meet long term goals. Long Term Goals:  Time Frame: 6 weeks    1. Patient will be indep with HEP in order to prevent re-injury and improve ability to perform functional mobility tasks. 2. Patient will improve LEFS score from 48/80 to 55/80 to promote improved functional mobility and overall QOL. Patient Education:   [x]  HEP/Education Completed: Continue HEP, monitor pain after progressions.  [employer] Access Code: [facility]  []  No new Education completed  [x]  Reviewed Prior HEP      [x]  Patient verbalized and/or demonstrated understanding of education provided. []  Patient unable to verbalize and/or demonstrate understanding of education provided. Will continue education. []  Barriers to learning:     PLAN:  Treatment Recommendations: Strengthening, Range of Motion, Functional Mobility Training, Gait Training, Stair Training, Manual Therapy - Soft Tissue Mobilization, Manual Therapy - Joint Manipulation, Pain Management, Home Exercise Program, Patient Education, Safety Education and Training and Modalities     Plan to see patient 2 times per week for 6 weeks to address the treatment planned outlined above.   [x]  Continue with current plan of care  []  Modify plan of care as follows:    []  Hold pending physician visit  []  Discharge    Time In 0915   Time Out 0958   Timed Code Minutes: 43 min   Total Treatment Time:  43 min Electronically Signed by: Werner Alexander PTA

## 2022-07-15 ENCOUNTER — HOSPITAL ENCOUNTER (OUTPATIENT)
Dept: PHYSICAL THERAPY | Age: 27
Setting detail: THERAPIES SERIES
Discharge: HOME | End: 2022-07-15
Payer: OTHER GOVERNMENT

## 2022-07-15 PROCEDURE — 97110 THERAPEUTIC EXERCISES: CPT

## 2022-07-15 NOTE — PROGRESS NOTES
[address] [facility]  PHYSICAL THERAPY  [] EVALUATION  [x] DAILY NOTE (LAND) [] DAILY NOTE (AQUATIC ) [] PROGRESS NOTE [] DISCHARGE NOTE    [x] OUTPATIENT REHABILITATION [facility]   [] [place] [address]    [] [facility]   [] Sukumar Zabala     Date: 7/15/2022    Patient Name:  Ramos Liao  \"Riggs\"    : 1995    MRN: [id number]   CSN: [id number]    Referring Practitioner Tamir Sauer DO   Diagnosis Pain in unspecified knee [M25.569]    Treatment Diagnosis B knee pain, B knee weakness, B knee stiffness, abnormal gait   Date of Evaluation 22    Additional Pertinent History Denies significant past medical history      Functional Outcome Measure Used LEFS   Functional Outcome Score 48/80 (22)       Insurance: Primary: Payor: Enedina Rodriguez /  /  / ,   Secondary:    Authorization Information:  YES APPROVED FOR PT; TOTAL OF 15 VISITS FROM 22 TO 10/14/22, Dania Light. #[id number]   Visit # 8/15, 8/10 for progress note   Visits Allowed: TOTAL OF 15 VISITS  FROM 22 TO ,   Recertification Date: 2022   Physician Follow-Up:    Physician Orders: eval and treat   History of Present Illness: Pt is a 32 y/o male presenting to skilled PT services with c/o B knee pain. 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 Pain has been worsening within the last month or two. Did have x-rays completed of both knees which were normal.      SUBJECTIVE: Patient reports doing pretty good today. Left knee still tightens up more than Right with activity.      TREATMENT   Precautions:    Pain: <1/10 Bilateral knees    \"X in shaded column indicates activity completed today    *\" next to exercise/intervention indicates progression   Modalities Parameters/  Location  Notes                     Manual Therapy Time/Technique  Notes   Patellar mobs: up/down, lateral  10x ea B   Patient performed today Exercise/Intervention   Notes   Matrix Bike (Seat 4) 6 min Level 4 X    SportsArt Bike (Seat 4) 6 min Level 4            Mat Exercises:       Quad set with towel post to knee 12x5 sec      SLR 15x      Side lying hip adduction and abduction 10x      Prone hip extension 10x             Prone squad stretch with strap 3x20 sec      HS stretch seated 3x30 sec      gastroc stretch with strap 2x30 sec      Seated quad stretch under chair - on R only 3x30 sec   Held today due to increasing pain           Standing:       Stretches at steps: Hamstrings, calves 3x 20 sec  X    TKE with ball at popliteal region 15x 5 sec  X More difficulty noted at LLE    Shallow wall squats with ball between knees  5x2 sec    Decreased hold time to assist with discomfort   TG squats with ball  2x10  X    Step ups: forward/lateral  20*x B  6 inch  X    Eccentric heel taps-bilateral  15*x Off edge of // bars  X    3 way hip kicks-bilateral  15x  X    Standing HS curls  15x  X    Rocker Board (2 directions) 20x 1 minute balance X    Gait education    Heel toe pattern, knee ext, shoe orthotics     Specific Interventions Next Treatment: bike warmup to promote knee ext, B LE strengthening (TKE), LE stretching (quads, gastroc/soleus, HS), gait training (TKE, heel strike), possible shoe insert for arch support, body mechanics, L patellar mobs, modalities PRN     Activity/Treatment Tolerance:  [x]  Patient tolerated treatment well []  Patient limited by fatigue  []  Patient limited by pain   []  Patient limited by medical complications  []  Other:     Assessment:  Limited progressions today due to patient being a little more sore after increased repetitions last therapy session. Patient felt and increased tightness in Left knee after completion of step ups. Mild increased pain at end of session     GOALS:  Patient Goal: reduce pain    Short Term Goals:  Time Frame: 3 weeks    1.  Patient will report decrease in pain to 3/10 at most to allow ease of ADL's and household tasks. 2. Patient will increase R knee ext AROM to lacking 4 degrees to improve ability to ambulate with a normalized gait pattern. 3. Patient will increase B knee ext strength to 4+/5 to promote ease of ambulating with a normalized gait pattern. 4. Patient will ambulate with community distances without and AD while demonstrating B TKE, heel strike, and equal stance time to allow improved overall gait mechanics. 6. Patient will be indep with HEP in order to meet long term goals. Long Term Goals:  Time Frame: 6 weeks    1. Patient will be indep with HEP in order to prevent re-injury and improve ability to perform functional mobility tasks. 2. Patient will improve LEFS score from 48/80 to 55/80 to promote improved functional mobility and overall QOL. Patient Education:   [x]  HEP/Education Completed: Continue HEP, monitor pain after progressions. [facility]  []  No new Education completed  [x]  Reviewed Prior HEP      [x]  Patient verbalized and/or demonstrated understanding of education provided. []  Patient unable to verbalize and/or demonstrate understanding of education provided. Will continue education. []  Barriers to learning:     PLAN:  Treatment Recommendations: Strengthening, Range of Motion, Functional Mobility Training, Gait Training, Stair Training, Manual Therapy - Soft Tissue Mobilization, Manual Therapy - Joint Manipulation, Pain Management, Home Exercise Program, Patient Education, Safety Education and Training and Modalities     Plan to see patient 2 times per week for 6 weeks to address the treatment planned outlined above.   [x]  Continue with current plan of care  []  Modify plan of care as follows:    []  Hold pending physician visit  []  Discharge    Time In 1345   Time Out 1430   Timed Code Minutes: 45 min   Total Treatment Time:  45 min     Electronically Signed by: Lilian Ace PTA

## 2022-07-19 ENCOUNTER — HOSPITAL ENCOUNTER (OUTPATIENT)
Dept: PHYSICAL THERAPY | Age: 27
Setting detail: THERAPIES SERIES
Discharge: HOME | End: 2022-07-19
Payer: OTHER GOVERNMENT

## 2022-07-19 PROCEDURE — 97110 THERAPEUTIC EXERCISES: CPT

## 2022-07-19 PROCEDURE — 97530 THERAPEUTIC ACTIVITIES: CPT

## 2022-07-19 NOTE — PROGRESS NOTES
** PLEASE SIGN, DATE AND TIME CERTIFICATION BELOW AND RETURN TO [facility] OUTPATIENT REHABILITATION (FAX #: [phone]). ATTEST/CO-SIGN IF ACCESSING VIA [employer]. THANK YOU.**    I certify that I have examined the patient below and determined that Physical Medicine and Rehabilitation service is necessary and that I approve the established plan of care for up to 90 days or as specifically noted. Attestation, signature or co-signature of physician indicates approval of certification requirements.    ________________________ ____________ __________  Physician Signature   Date   Time    [facility]  PHYSICAL THERAPY  [] EVALUATION  [] DAILY NOTE (LAND) [] DAILY NOTE (AQUATIC ) [x] PROGRESS NOTE [] DISCHARGE NOTE    [x] [facility]   [] [place] [address]    [] [facility]   [] Severiano Stapler     Date: 2022    Patient Name:  Cody Bearden  \"Riggs\"    : 1995    MRN: [id number]   CSN: [id number]    Referring Practitioner Hollis Aguirre DO   Diagnosis Pain in unspecified knee [M25.569]    Treatment Diagnosis B knee pain, B knee weakness, B knee stiffness, abnormal gait   Date of Evaluation 22    Additional Pertinent History Denies significant past medical history      Functional Outcome Measure Used LEFS   Functional Outcome Score 48/80 (22) 49/80 (22)      Insurance: Primary: Payor: Raji Schmidt /  /  / ,   Secondary:    Authorization Information:  YES APPROVED FOR PT; TOTAL OF 15 VISITS FROM 22 TO 10/14/22, Donavon Gonzalez. #[id number]   Visit # 9/15, 9/10 for progress note   Visits Allowed: TOTAL OF 15 VISITS  FROM 22 TO ,   Recertification Date: 2022   Physician Follow-Up:    Physician Orders: eval and treat   History of Present Illness: Pt is a 30 y/o male presenting to skilled PT services with c/o B knee pain.  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 Pain has been worsening within the last month or two. Did have x-rays completed of both knees which were normal.      SUBJECTIVE: Patient reports he is unsure what he did on Sunday but that evening L knee was very sore. Reports 2/10 L knee pain and 0.5/10 R knee pain today. Does feel as though skilled PT services are helping. Reports improved B knee mobility.     TREATMENT   Precautions:    Pain: 0.5-2/10 Bilateral knees    \"X in shaded column indicates activity completed today    *\" next to exercise/intervention indicates progression   Modalities Parameters/  Location  Notes                     Manual Therapy Time/Technique  Notes   Patellar mobs: up/down, lateral  10x ea B   Patient performed today               Exercise/Intervention   Notes   Matrix Bike (Seat 4) 6 min Level 4 X    SportsArt Bike (Seat 4) 6 min Level 4            Mat Exercises:       Quad set with towel post to knee 12x5 sec      SLR 15x      Side lying hip adduction and abduction 10x      Prone hip extension 10x             Prone squad stretch with strap 3x20 sec      HS stretch seated 3x30 sec      gastroc stretch with strap 2x30 sec      Seated quad stretch under chair - on R only 3x30 sec   Held today due to increasing pain           Standing:       Stretches at steps: Hamstrings, calves 3x 20 sec  X    TKE with ball at popliteal region 15x 5 sec  X More difficulty noted at LLE    Shallow wall squats with ball between knees  5x2 sec    Decreased hold time to assist with discomfort   TG squats with ball  2x10      Step ups: forward/lateral  20x B  6 inch  X    Eccentric heel taps-bilateral  15x Off edge of // bars  X    3 way hip kicks-bilateral  15x  X    Standing HS curls  15x  X    Rocker Board (2 directions) 20x 1 minute balance     NK table knee ext/flex    Try next session   Gait education    Heel toe pattern, knee ext, shoe orthotics     Specific Interventions Next Treatment: bike warmup to promote knee ext, B LE strengthening (TKE), LE stretching (quads, gastroc/soleus, HS), gait training (TKE, heel strike), possible shoe insert for arch support, body mechanics, L patellar mobs, modalities PRN     Activity/Treatment Tolerance:  [x]  Patient tolerated treatment well []  Patient limited by fatigue  []  Patient limited by pain   []  Patient limited by medical complications  []  Other:     Assessment:  Pt has made fair progress during his time in skilled PT services, meeting 2/5 STG and 0/2 LTG. He reports decreased pain in B knees, improved R knee ext AROM, and increased R knee strength. Continues to demonstrate weakness with L knee flex and ext. He demonstrates improved overall gait mechanics with B heel strike and equal stance time, however continues to lack TKE. HE reports min improvement with functional mobility tasks, increasing his LEFS score from 48/80 at eval to 49/80 today. He will benefit from continued skilled PT services to further improve strength, reduce pain and improve overall gait mechanics to improve ability to perform functional mobility tasks. GOALS:  Patient Goal: reduce pain    Short Term Goals:  Time Frame: 3 weeks    1. Patient will report decrease in pain to 3/10 at most to allow ease of ADL's and household tasks. [x] Goal Met [] Goal Not Met [] Continue Goal [] Discontinue Goal  [x] Revise Goal  Goal Assessment: pt reports 1/10 B knee pain on average  New Goal: Patient will report decrease in pain to </=1/10 in B knees at most to allow ease of ADL's and household tasks. 2. Patient will increase R knee ext AROM to lacking 4 degrees to improve ability to ambulate with a normalized gait pattern. [x] Goal Met [] Goal Not Met [] Continue Goal [x] Discontinue Goal  [] Revise Goal  Goal Assessment: R knee ext lacking 4 deg    3.  Patient will increase B knee ext strength to 4+/5 to promote ease of ambulating with a normalized gait pattern.  [] Goal Met [x] Goal Not Met [x] Continue Goal [] Discontinue Goal  [] Revise Goal  Goal Assessment: R knee: ext 4+/5 flex 4+/5, L knee: ext 4/5, flex 4/5  New Goal: Patient will increase L knee ext strength to 4+/5 to promote ease of ambulating with a normalized gait pattern. 4. Patient will ambulate with community distances without and AD while demonstrating B TKE, heel strike, and equal stance time to allow improved overall gait mechanics. [] Goal Met [x] Goal Not Met [x] Continue Goal [] Discontinue Goal  [] Revise Goal  Goal Assessment: Pt demonstrates improved B heel strike and equal stance time, however continues to lack TKE    5. Patient will be indep with HEP in order to meet long term goals. [] Goal Met [x] Goal Not Met [x] Continue Goal [] Discontinue Goal  [] Revise Goal  Goal Assessment: reports variable compliance with HEP      Long Term Goals:  Time Frame: 6 weeks    1. Patient will be indep with HEP in order to prevent re-injury and improve ability to perform functional mobility tasks. [] Goal Met [x] Goal Not Met [x] Continue Goal [] Discontinue Goal  [] Revise Goal    2. Patient will improve LEFS score from 48/80 to 55/80 to promote improved functional mobility and overall QOL. [] Goal Met [x] Goal Not Met [] Continue Goal [] Discontinue Goal  [x] Revise Goal  Goal Assessment: 49/80 today  New Goal: Patient will improve LEFS score from 49/80 to 52/80 to promote improved functional mobility and overall QOL. Patient Education:   [x]  HEP/Education Completed: Continue HEP, discussed how he may benefit from custom orthotics to reduce talar drop  [employer] Access Code: [facility]  []  No new Education completed  [x]  Reviewed Prior HEP      [x]  Patient verbalized and/or demonstrated understanding of education provided. []  Patient unable to verbalize and/or demonstrate understanding of education provided. Will continue education.   []  Barriers to learning:     PLAN:  Treatment Recommendations: Strengthening, Range of Motion, Functional Mobility Training, Gait Training, Stair Training, Manual Therapy - Soft Tissue Mobilization, Manual Therapy - Joint Manipulation, Pain Management, Home Exercise Program, Patient Education, Safety Education and Training and Modalities     Plan to see patient 2 times per week for 6 weeks to address the treatment planned outlined above.   [x]  Continue with current plan of care  []  Modify plan of care as follows:    []  Hold pending physician visit  []  Discharge    Time In 1350   Time Out 1430   Timed Code Minutes: 40 min   Total Treatment Time:  40 min     Electronically Signed by: Cecelia Manrique PT

## 2022-07-26 ENCOUNTER — HOSPITAL ENCOUNTER (OUTPATIENT)
Dept: PHYSICAL THERAPY | Age: 27
Setting detail: THERAPIES SERIES
Discharge: HOME | End: 2022-07-26
Payer: OTHER GOVERNMENT

## 2022-07-26 PROCEDURE — 97110 THERAPEUTIC EXERCISES: CPT

## 2022-07-26 NOTE — PROGRESS NOTES
[facility]  PHYSICAL THERAPY  [] EVALUATION  [x] DAILY NOTE (LAND) [] DAILY NOTE (AQUATIC ) [] PROGRESS NOTE [] DISCHARGE NOTE    [x] OUTPATIENT REHABILITATION CENTER [facility]   [] [place] [address]    [] [facility]   [] Tj Erps     Date: 2022    Patient Name:  Chase Milner  \"Oneil\"    : 1995    MRN: [id number]   CSN: [id number]    Referring Practitioner Alfredito Ontiveros DO   Diagnosis Pain in unspecified knee [M25.569]    Treatment Diagnosis B knee pain, B knee weakness, B knee stiffness, abnormal gait   Date of Evaluation 22    Additional Pertinent History Denies significant past medical history      Functional Outcome Measure Used LEFS   Functional Outcome Score 48/80 (22) 49/80 (22)      Insurance: Primary: Payor: Marzena Woodruff /  /  / ,   Secondary:    Authorization Information:  YES APPROVED FOR PT; TOTAL OF 15 VISITS FROM 22 TO 10/14/22, Jaya Welsh. #[id number]   Visit # 10/15, 1/10 for progress note   Visits Allowed: TOTAL OF 15 VISITS  FROM 22 TO 46/15/81,   Recertification Date: 2022   Physician Follow-Up:    Physician Orders: eval and treat   History of Present Illness: Pt is a 32 y/o male presenting to skilled PT services with c/o B knee pain. 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 Pain has been worsening within the last month or two. Did have x-rays completed of both knees which were normal.      SUBJECTIVE: Patient reports he forgot his appt on Thursday.  Reports 0.5/10 R knee pain and no pain on the L.       TREATMENT   Precautions:    Pain: 0.5/10 L knees    \"X in shaded column indicates activity completed today    *\" next to exercise/intervention indicates progression   Modalities Parameters/  Location  Notes                     Manual Therapy Time/Technique  Notes   Patellar mobs: up/down, lateral  10x ea B   Patient performed today stated it was more of a muscle sore/fatigue due to exercising. GOALS:  Patient Goal: reduce pain    Short Term Goals:  Time Frame: 3 weeks    1. Patient will report decrease in pain to </=1/10 in B knees at most to allow ease of ADL's and household tasks. 2. Patient will increase L knee ext strength to 4+/5 to promote ease of ambulating with a normalized gait pattern. 3. Patient will ambulate with community distances without and AD while demonstrating B TKE, heel strike, and equal stance time to allow improved overall gait mechanics. 4. Patient will be indep with HEP in order to meet long term goals. Long Term Goals:  Time Frame: 6 weeks    1. Patient will be indep with HEP in order to prevent re-injury and improve ability to perform functional mobility tasks. 2. Patient will improve LEFS score from 49/80 to 52/80 to promote improved functional mobility and overall QOL. Patient Education:   [x]  HEP/Education Completed: updated HEP  Medbridge Access Code: [facility]  []  No new Education completed  [x]  Reviewed Prior HEP      [x]  Patient verbalized and/or demonstrated understanding of education provided. []  Patient unable to verbalize and/or demonstrate understanding of education provided. Will continue education. []  Barriers to learning:     PLAN:  Treatment Recommendations: Strengthening, Range of Motion, Functional Mobility Training, Gait Training, Stair Training, Manual Therapy - Soft Tissue Mobilization, Manual Therapy - Joint Manipulation, Pain Management, Home Exercise Program, Patient Education, Safety Education and Training and Modalities     Plan to see patient 2 times per week for 6 weeks to address the treatment planned outlined above.   [x]  Continue with current plan of care  []  Modify plan of care as follows:    []  Hold pending physician visit  []  Discharge    Time In 1346   Time Out 1426   Timed Code Minutes: 40 min   Total Treatment Time:  40 min     Electronically Signed by: Tonio Barger, PT

## 2022-07-28 ENCOUNTER — HOSPITAL ENCOUNTER (OUTPATIENT)
Dept: PHYSICAL THERAPY | Age: 27
Setting detail: THERAPIES SERIES
Discharge: HOME | End: 2022-07-28
Payer: OTHER GOVERNMENT

## 2022-07-28 PROCEDURE — 97110 THERAPEUTIC EXERCISES: CPT

## 2022-07-28 NOTE — PROGRESS NOTES
[facility] Podiatry    [address] [place] [address]    Phone:  [phone]    Fax:  [phone]       Thank You for choosing us for your health care visit. We are glad to serve you and happy to provide you with this summary of your visit. Please help us to ensure we have accurate records. If you find anything that needs to be changed, please let our staff know as soon as possible.          Your Demographic Information     Patient Name Sex Nemesio Pritchard Male 2003       Ethnic Group Patient Race    Not of  or  Origin White      Your Visit Details     Date & Time Provider Department    2017 2:45 PM Teodoro Russell DPM [facility] Podiatry      Your Upcoming Appointment*(Max 10)     Wednesday May 10, 2017  3:45 PM CDT   Office Visit with Teodoro Russell DPM   [facility] Podiatry ([facility])    [address] [place] [address]   [phone]              Your To Do List     Future Orders Please Complete On or Around Expires    XR TOE 2+ VW LEFT      Follow-Up    Return in about 1 week (around 5/3/2017) for F/U left ingrown toenail.      We Ordered or Performed the Following     REMOVAL OF NAIL PLATE       Conditions Discussed Today or Order-Related Diagnoses        Comments    Ingrown left greater toenail    -  Primary     Cellulitis of great toe, left           Your Vitals Were     Temp Weight Smoking Status             97.8 °F (36.6 °C) (Temporal Artery) 162 lb (73.5 kg) (91 %, Z= 1.31)* Never Smoker       *Growth percentiles are based on CDC 2-20 Years data.      Medications Prescribed or Re-Ordered Today     sulfamethoxazole-trimethoprim (BACTRIM DS) 800-160 MG per tablet    Sig - Route: Take 1 tablet by mouth 2 times daily for 7 days. - Oral    Class: Eprescribe    Pharmacy: [id number] - [facility] [id number] [address]  ** PLEASE SIGN, DATE AND TIME CERTIFICATION BELOW AND RETURN TO [facility] OUTPATIENT REHABILITATION (FAX #: [phone]). ATTEST/CO-SIGN IF ACCESSING VIA [employer]. THANK YOU.**    I certify that I have examined the patient below and determined that Physical Medicine and Rehabilitation service is necessary and that I approve the established plan of care for up to 90 days or as specifically noted. Attestation, signature or co-signature of physician indicates approval of certification requirements.    ________________________ ____________ __________  Physician Signature   Date   Time      [address] [facility]  PHYSICAL THERAPY  [] EVALUATION  [] DAILY NOTE (LAND) [] DAILY NOTE (AQUATIC ) [x] PROGRESS NOTE [] DISCHARGE NOTE    [x] [facility]   [] [place] [address]    [] [facility]   [] [facility]     Date: 2022    Patient Name:  Marcial Dale  \"Riggs\"    : 1995    MRN: [id number]   CSN: [id number]    Referring Practitioner Aylin Tay DO   Diagnosis Pain in unspecified knee [M25.569]    Treatment Diagnosis B knee pain, B knee weakness, B knee stiffness, abnormal gait   Date of Evaluation 22    Additional Pertinent History Denies significant past medical history      Functional Outcome Measure Used LEFS   Functional Outcome Score 48/80 (22) 49/80 (22) 63/80 (22)      Insurance: Primary: Payor: Anahi Goins /  /  / ,   Secondary:    Authorization Information:  YES APPROVED FOR PT; TOTAL OF 15 VISITS FROM 22 TO 10/14/22, Nury Headings. #[id number]   Visit # 11/15, 0/10 for progress note   Visits Allowed: TOTAL OF 15 VISITS  FROM 22 TO /42/40,   Recertification Date: 2022   Physician Follow-Up:    Physician Orders: eval and treat   History of Present Illness: Pt is a 32 y/o male presenting to skilled PT services with c/o B knee pain.  Reports pain started several years ago when he was in the [address]  #: [phone]      Your Current Medications Are        Disp Refills Start End    sulfamethoxazole-trimethoprim (BACTRIM DS) 800-160 MG per tablet 14 tablet 0 4/26/2017 5/3/2017    Sig - Route: Take 1 tablet by mouth 2 times daily for 7 days. - Oral    Class: Eprescribe      Discontinued Medications        Reason for Discontinue    dicloxacillin (DYNAPEN) 500 MG capsule Therapy Completed      Allergies     No Known Allergies              Patient Instructions        Nail Instructions After Surgery      Please follow these instructions to heal quickly after your surgery.    You will need the following:  * Epsom salts  * Topical antibiotic ointment  * Large adhesive bandages  * Large clean basin for soaking    After your nail surgery    1. Go home and elevate your foot, relax as much as you can for the next 24 hours.    2. Remove the bandage in 24 hours. If the bandage sticks, soak the bandage using the instructions below for 5 minutes, the remove the bandage and soak for the remaining 10 minutes    3. Soak the affected area twice daily for 2 weeks following these soaking instructions:  * Dissolve 2 tablespoons of Epsom salts in 1 quart of warm water.  * Pull skin back from nail edges  * Soak for 15 minutes  * Pat dry with clean towel.  * Apply topical antibiotic ointment to the affected area  * Cover with large bandage    4. Take Acetaminophen or Ibuprofen as needed for pain    5. Some bleeding on the bandage is normal. If bandage becomes soaked with blood call the office.    6. Follow up as instructed by the office. Call the office if you have any questions or concerns.  [phone] Clinic telephone  [phone] After hours paging          Soluto with insidious onset. Pain has been worsening within the last month or two. Did have x-rays completed of both knees which were normal.      SUBJECTIVE: Patient reports 0.5/10 L knee pain and no pain on the R. Does feel as though skilled PT services have helped to improve B knee mobility. Wants to return to work and see how he does. Would like to be put on hold after today's treatment. TREATMENT   Precautions:    Pain: 0.5/10 L knees    \"X in shaded column indicates activity completed today    *\" next to exercise/intervention indicates progression   Modalities Parameters/  Location  Notes                     Manual Therapy Time/Technique  Notes   Patellar mobs: up/down, lateral  10x ea B   Patient performed today               Exercise/Intervention   Notes   Matrix Bike (Seat 4) 6 min Level 4     SportsArt Bike (Seat 4) 6 min Level 4 x           Mat Exercises:       Quad set with towel post to knee 12x5 sec      SLR 15x      Side lying hip adduction and abduction 10x      Prone hip extension 10x             Prone squad stretch with strap 3x20 sec      HS stretch seated 3x30 sec      gastroc stretch with strap 2x30 sec      Seated quad stretch under chair - on R only 3x30 sec   Held today due to increasing pain           Standing:       Stretches at steps: Hamstrings, calves 3x 20 sec  X    TKE with ball at popliteal region 15x 5 sec      Shallow wall squats with ball between knees  5x2 sec    Decreased hold time to assist with discomfort   TG squats with ball  2x10  X    Step ups: forward/lateral  20x B  6 inch  X    Eccentric heel taps-bilateral  10x 4\"  X    3 way hip kicks-bilateral  15x      Standing HS curls  15x      Rocker Board (2 directions) 20x 1 minute balance X    NK table knee ext/flex 10x 5# X    Squat, lunges: fwd 10x  X    Monster walks 15 ft x4 green  Band around knees. ..try ankles next session   Gait education    Heel toe pattern, knee ext, shoe orthotics     Specific Interventions Next Assessment: reports performing HEP daily    Long Term Goals:  Time Frame: 6 weeks    1. Patient will be indep with HEP in order to prevent re-injury and improve ability to perform functional mobility tasks. [] Goal Met [x] Goal Not Met [x] Continue Goal [] Discontinue Goal  [] Revise Goal    2. Patient will improve LEFS score from 49/80 to 52/80 to promote improved functional mobility and overall QOL. [x] Goal Met [] Goal Not Met [] Continue Goal [] Discontinue Goal  [x] Revise Goal  Goal Assessment: 63/80 today  New Goal: Patient will improve LEFS score from 63/80 to 66/80 to promote improved functional mobility and overall QOL. Patient Education:   [x]  HEP/Education Completed: continue HEP  Medbridge Access Code: [facility]  []  No new Education completed  [x]  Reviewed Prior HEP      [x]  Patient verbalized and/or demonstrated understanding of education provided. []  Patient unable to verbalize and/or demonstrate understanding of education provided. Will continue education. []  Barriers to learning:     PLAN:  Treatment Recommendations: Strengthening, Range of Motion, Functional Mobility Training, Gait Training, Stair Training, Manual Therapy - Soft Tissue Mobilization, Manual Therapy - Joint Manipulation, Pain Management, Home Exercise Program, Patient Education, Safety Education and Training and Modalities     Plan to see patient 2 times per week for 6 weeks to address the treatment planned outlined above.   []  Continue with current plan of care  []  Modify plan of care as follows:    [x]  Hold pending  return to work  []  Discharge    Time In 1346   Time Out 1426   Timed Code Minutes: 40 min   Total Treatment Time:  40 min     Electronically Signed by: Audrey Solomon PT

## 2022-07-29 ENCOUNTER — INITIAL CONSULT (OUTPATIENT)
Dept: PULMONOLOGY | Age: 27
End: 2022-07-29
Payer: OTHER GOVERNMENT

## 2022-07-29 VITALS
BODY MASS INDEX: 28.79 KG/M2 | HEART RATE: 92 BPM | OXYGEN SATURATION: 97 % | WEIGHT: 190 LBS | DIASTOLIC BLOOD PRESSURE: 78 MMHG | TEMPERATURE: 97.6 F | HEIGHT: 68 IN | SYSTOLIC BLOOD PRESSURE: 124 MMHG

## 2022-07-29 DIAGNOSIS — R06.83 SNORING: ICD-10-CM

## 2022-07-29 DIAGNOSIS — R06.81 WITNESSED EPISODE OF APNEA: ICD-10-CM

## 2022-07-29 DIAGNOSIS — G47.33 OBSTRUCTIVE SLEEP APNEA: Primary | ICD-10-CM

## 2022-07-29 DIAGNOSIS — G47.10 HYPERSOMNIA: ICD-10-CM

## 2022-07-29 PROCEDURE — 99203 OFFICE O/P NEW LOW 30 MIN: CPT | Performed by: INTERNAL MEDICINE

## 2022-07-29 NOTE — PATIENT INSTRUCTIONS
Recommendations/Plan:  -Will schedule patient for polysomnogram in the sleep lab. -I had a discussion with patient regarding avialable treatment options for his sleep disorder breathing including but not limited to CPAP titration in the sleep lab Vs.Dental appliance placement with referral to a local dentist Vs other available surgical options including Uvulopalatopharyngoplasty, maxillomandibular ostomy and tracheostomy as last option. At the end of discussion, he is not decided on his   treatment if he found to have obstructive sleep apnea at this time.  -We will see Kyle Maria back in 1week after the sleep study to go over the sleep study results and further management options.  -He was educated to practice good sleep hygiene practices. He was provided with a good sleep hygiene hand out.  -Mela Opitz was advised to make earlier appointment with my clinic if he develops any worsening of sleep symptoms. He verbalizes understanding.  -Mela Opitz was advised to not to drive any motor vehicles or operate heavy equipment until his sleep symptoms are under good control. Kyle Maria verbalizes understanding.  -He was advised to loose weight by controlling diet and doing exercise once cleared by his family physician. - Kyle Maria was educated about my impression and plan. He verbalizes understanding.

## 2022-07-29 NOTE — PROGRESS NOTES
New Sleep Patient H/P    Presentation:  Suzette Cates is referred by [facility] for snoring, witnessed apneas, excessive daytime fatigue. Obstructive or central sleep apnea:  Snoring present   Witnessed apneas present   Choking/Shortness of breath present   Orthopnea absent  Morning dry mouth Intermittently   Morning headaches present   Nasal congestion periodically   Reflux/Heartburn present but not when he wakes up     Time in Bed:   Bedtime: 12p.m. Awakens  7 a.m. Different on weekends? Yes  How? Goes to bed whenever he wants and awakes when he is ready. Going to sleep 5-7 am and awakening 1-2 pm. .      Suzette Cates falls asleep in 20-30  minutes. Any awakenings? Yes  Difficulty Falling back to sleep? No  Symptoms began:  6 -7 years ago. Symptoms include: snoring, gasping, periods of not breathing, excessive daytime sleepiness, falling asleep while driving, watching television    Previous evaluation and treatment? No     He denies any history of sleep walking or sleep talking. No history of seizures activity. No history suggestive of restless legs syndrome. No history of bruxism. No history of head injury. Naps:  Any naps? On occasion and are they helpful sometimes     Snoring and Apneas:  Do you snore or been told you a snore? Yes  How long have known about your snoring? 1 year   Any witnessed apneas? Yes last few months   Any awakenings with choking or gasping? Yes occurred 4 years ago     Dreams:  Any recurring dreams? Yes  Hallucinations? No  Sleep Paralysis? Yes  Symptoms of Cataplexy? 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 No  Any sleepiness while driving? Yes    Weight:  Any change in weight over the past year? Yes   How about past 5 years?  Yes  How much? 20 lbs gain in past 5 years     Other Compliants :Suzette Cates complains of Conjunctivae are normal. Pupils are equal, round, and reactive to light. No scleral icterus. Neck: Neck supple. No JVD present. No tracheal deviation present. Cardiovascular: Normal rate, regular rhythm, normal heart sounds. No murmur heard. Pulmonary/Chest: Effort normal and breath sounds normal. No stridor. No respiratory distress. No wheezes. No rales. Patient exhibits no tenderness. Abdominal: Soft. Patient exhibits no distension. No tenderness. Musculoskeletal: Normal range of motion. Patient exhibits no edema and no tenderness. Lymphadenopathy:  No cervical adenopathy. Neurological: Patient is alert and oriented to person, place, and time. Skin: Skin is warm and dry. Patient is not diaphoretic. Psychiatric: Patient  has a normal mood and affect. Patient behavior is normal.     Diagnostic Data:    Assessment   Excessive daytime fatigue   Witnessed apnea    Snoring   poor sleep hygiene  Neck 16.75 inches       Plan   Weight Loss Information discussed? Yes  Sleep Hygiene Discussed? Yes  PSG - Inpatient sleep study   Patient does not know if he would be able to tolerate PAP therapy, however does states he would consider it as well as dental devices. Follow up in 1 week after sleep study to discuss results and possible intervention options if needed     Mahnaz Brown informed to not to drive any motor vehicles or operate heavy equipment until His sleep symptoms are under good control. He verbalizes understanding.    -He call my office for earlier appointment if needed for worsening of sleep symptoms.  -Carisa Lawrence educated about my impression and plan. Patient verbalizes understanding. Electronically signed by Cynthia Jara DO on 7/29/2022 at 10:56 AM      Addendum by Dr. Kelby Schroeder MD:  Patient seen by me independently including key components of medical care. Face to face evaluation and examination was performed. Case discussed with Dr. Cynthia Jara DO -resident physician.   Italicized Medications, Allergies in the discrete data section of the patient chart along with labs, Pulmonary medicine,Sleep medicine related, Pathological, Microbiological and Radiological investigations.

## 2022-07-29 NOTE — PROGRESS NOTES
Chief Complaint:    Mallampati airway Class:4  Neck Circumference:16.75  Inches    [place] sleepiness score 7/29/22:14  Sleep apnea Quality of Life questionnaire:  47

## 2022-08-23 NOTE — PROGRESS NOTES
[place] [facility] [address] Follow up note    Marilee Ace                                            Chief Complaint and [place]: Marilee Ace is a 31 y. o.oldmale came for follow up regarding his sleep study results. He underwent sleep study on 7 September 2022. He is still complaining of excessive day time sleepiness with no improvement compared to last visit. Review of Systems:   General/Constitutional: he gained 3lbs of weight from the last visit with normal appetite. No fever or chills. HENT: Negative. Eyes: Negative. Upper respiratory tract: Occasional nasal stuffiness with  post nasal drip. Lower respiratory tract/ lungs: No cough or sputum production. No hemoptysis. Cardiovascular: No palpitations or chest pain. Gastrointestinal: No nausea or vomiting. Neurological: No focal neurologiacal weakness. Extremities: No edema. Musculoskeletal: No complaints. Genitourinary: No complaints. Hematological: Negative. Psychiatric/Behavioral: Negative. Skin: No itching. No past medical history on file. Past Surgical History:   Procedure Laterality Date    LASIK         Social History     Tobacco Use    Smoking status: Never    Smokeless tobacco: Never   Substance Use Topics    Alcohol use: Yes     Comment: occationally    Drug use: Never       No Known Allergies    No current outpatient medications on file. No current facility-administered medications for this visit. Family History   Problem Relation Age of Onset    Heart Attack Mother           /72 (Site: Left Upper Arm, Position: Sitting, Cuff Size: Medium Adult)   Pulse 76   Temp 97.7 °F (36.5 °C)   Ht 5' 8\" (1.727 m)   Wt 193 lb (87.5 kg)   SpO2 96% Comment: room air at rest  BMI 29.35 kg/m²   BMI:  Body mass index is 29.35 kg/m².      Mallampati airway Class:4  Neck Circumference:.16.75 Inches  [place] sleepiness score 9/16/22: 13  Sleep apnea quality of life questionnaire: 60    Physical Exam :  Constitutional: Patient appears moderately built and moderately nourished. No distress. Patient is oriented to person, place, and time. HENT: Hypertrophied nasal turbinates with pale nasal mucosa bilaterally more on the left side than right side with slight deviated nasal septum to the right side  Head: Normocephalic and atraumatic. Right Ear: External ear normal.   Left Ear: External ear normal.   Mouth/Throat: Oropharynx is clear and moist.   Eyes: Conjunctivae are normal. Pupils are equal and reactive to light. No scleral icterus. Neck: Neck supple. No JVD present. Cardiovascular: Normal rate, regular rhythm, normal heart sounds. No murmur heard. Pulmonary/Chest: Effort normal and breath sounds normal. No stridor. No respiratory distress. No wheezes. No rales. Abdominal: Soft. Patient exhibits no distension. No tenderness. Musculoskeletal: Normal range of motion. Extremities: Patient exhibits no erythema or no edema. Lymphadenopathy:  No cervical adenopathy. Neurological: Patient is alert and oriented to person, place, and time. Skin: Skin is warm and dry. Patient is not diaphoretic. Psychiatric: Patient  has a normal mood and affect. Diagnostic Data:                        SLEEP STUDY REPORT     PATIENT NAME: Ana VELASQUEZ                       :        1995  MED REC NO:   [id number]                           ROOM:  ACCOUNT NO:   [de-identified]                           ADMIT DATE: 2022  PROVIDER:     Araceli Stewart. MD Marv     DATE OF STUDY:  2022     REFERRING PROVIDER:  Jass Smith DO     The patient's height is 68 inches, weight is 190 pounds with a BMI of    IMPRESSION:  1. Mild obstructive sleep apnea with worsening of respiratory events in  supine position (the sleep study was scored by using AASM 1A hypopnea  rule-3% desaturation criteria).   2.  Decreased and delayed REM sleep limiting the interpretation of the  sleep study. 3.  Hypersomnia with [place] Sleepiness Score of 14, most likely due to  sleep apnea. Assesment:  -Mild obstructive sleep apnea with worsening of respiratory events in supine position (the sleep study was scored by using AASM 1A hypopnea rule-3% desaturation criteria). -Newly diagnosed. -Hypersomnia ( Excessive daytime sleepiness) may be due to obstructive sleep apnea. -Allergic rhinitis-not under control.  -Deviated nasal septum to right side. Recommendations/Plan:  - Start patient on Flonase 50mcg/INH 2sprays each nostril daily. He  was informed about adverse effects of Flonase. He was demonstrated in my office how to use Flonase. He verbalizes understanding.  -I had a discussion with patient regarding avialable treatment options for his sleep disorder breathing including but not limited to CPAP titration in the sleep lab Vs.Dental appliance placement with referral to a local dentist Vs other available surgical options including Uvulopalatopharyngoplasty, maxillomandibular ostomy, inspire device placement and tracheostomy as last option. At the end of discussion, he decided to go for dental appliance as a treatment for his mild obstructive sleep apnea due to associated hypersomnia. -Schedule patient for Dental medicine consult with [facility] for further evaluation and placement of Dental appliance for his obstructive sleep apnea.  -He advised to keep good compliance with recommended dental device therapy to get optimal results and clinical improvement.  -Janeth Garcia was informed to call for follow up with my clinic in 3 months after undergoing dental appliance placement for clinical reevaluation and to schedule for base line sleep study with dental device in place to check for residual sleep apnea.    -He was advised to continue to practice good sleep hygiene practices.  -He was advised to loose weight by controlling diet and doing exercise.  -He was instructed to not to drive any motor vehicles or operate heavy equipment if he feels sleepy. -Carisa Lawrence was educated about my impression and plan. He verbalizes understanding.      -I personally reviewed updated the Past medical hx, Past surgical hx,Social hx, Family hx, Medications, Allergies in the discrete data section of the patient chart along with labs, Pulmonary medicine,Sleep medicine related, Pathological, Microbiological and Radiological investigations.

## 2022-09-06 NOTE — DISCHARGE SUMMARY
[facility] NOTE  OUTPATIENT  [facility]    Patient Name: Jorge Whitaker        CSN: [id number]   YOB: 1995  Gender: male  Rigoberto Restrepo DO,    Pain in unspecified knee [M25.569] ,      Patient is discharged from Physical Therapy services at this time. See last note for details related to results of therapy and goal achievement. Reason for discharge: Was on hold to see how he tolerated return to work and school. Pt stated he is doing well although has not yet returned to work. No additional therapy required and pt is d/c at this time.       Leonides Marie PT ,DPT [id number]

## 2022-09-07 ENCOUNTER — HOSPITAL ENCOUNTER (OUTPATIENT)
Dept: SLEEP CENTER | Age: 27
Discharge: HOME | End: 2022-09-09
Payer: OTHER GOVERNMENT

## 2022-09-07 DIAGNOSIS — G47.10 HYPERSOMNIA: ICD-10-CM

## 2022-09-07 DIAGNOSIS — R06.81 WITNESSED EPISODE OF APNEA: ICD-10-CM

## 2022-09-07 DIAGNOSIS — R06.83 SNORING: ICD-10-CM

## 2022-09-07 DIAGNOSIS — G47.33 OBSTRUCTIVE SLEEP APNEA: ICD-10-CM

## 2022-09-07 PROCEDURE — 95810 POLYSOM 6/> YRS 4/> PARAM: CPT

## 2022-09-08 LAB — STATUS: NORMAL

## 2022-09-09 NOTE — PROGRESS NOTES
[address] [place] [address]                               SLEEP STUDY REPORT    PATIENT NAME: Sadiq VELASQUEZ                       :        1995  MED REC NO:   [id number]                           ROOM:  ACCOUNT NO:   [de-identified]                           ADMIT DATE: 2022  PROVIDER:     Raquel Ward. MD Marv    DATE OF STUDY:  2022    REFERRING PROVIDER:  Senia Kline DO    The patient's height is 68 inches, weight is 190 pounds with a BMI of  28.9. HISTORY:  The patient is a 79-year-old gentleman who was initially  evaluated by me on 2022. The patient is currently suffering with  hypersomnia with [place] Sleepiness Score of 14. The patient was found  to have a class IV Mallampati airway. The patient is scheduled for  sleep study to evaluate for sleep apnea as an etiology for hypersomnia. METHODS:  The patient underwent digital polysomnography in compliance  with the standards and specifications from the AASM Manual including the  simultaneous recording of 3 EEG channels (F4-M1, C4-M1, and O2-M1 with  back up electrodes F3-M2, C3-M2, and O1-M2), 2 EOG channels (E1-M2, and  E2-M1,), EMG (chin, left & right leg), EKG, Nonin pulse oximetry with   less than 2 second averaging time, body position, airflow recorded by  oral-nasal thermal sensor and nasal air pressure transducer, plus  respiratory effort recorded by calibrated respiratory inductance  plethysmography (RIP), flow volume loop, sound and video. The sleep study was scored by using AASM 1A hypopnea rule (3%  desaturation criteria). INTERPRETATION:  This is a baseline sleep study and the study was  performed on 2022.   The study was started at 09:40 p.m. and was  terminated at 04:27 a.m. with a total recording time of 407.2 minutes,  sleep period time was 403.9 minutes, total sleep time was 356.4 minutes,  and overall sleep efficiency was 87.5%. The sleep onset latency was 3.4  minutes, wake after sleep onset was 47.5 minutes, and REM sleep latency  was 159.5 minutes. SLEEP STAGING AND DISTRIBUTION SUMMARY:  Revealed the patient spent 30  minutes in stage I consisting of 8.4% of total sleep time, 227.4 minutes  in stage II consisting of 63.8%, 49 minutes in stage III consisting of  13.7%, 50 minutes in REM sleep consisting of 14% of total sleep time. RESPIRATORY EVENT ANALYSIS:  Revealed the patient had a total of 14  apneas, all of them were obstructive in nature. The patient also had a  total of 63 hypopneas, all of them were obstructive in nature. The  total number of apneas and hypopneas recorded during the study was 77  with an apnea-hypopnea index of 13. The patient's REM sleep  apnea-hypopnea index was 10.8. The hypopneas were scored by using the  AASM 1A hypopnea rule (3% desaturation criteria). POSITION ANALYSIS:  Revealed the patient spent 168.3 minutes in supine  position, 11 minutes in left lateral position, 177.1 minutes in right  lateral position with a supine apnea-hypopnea index was 16.8 whereas  right lateral position apnea-hypopnea index was 0. PERIODIC LIMB MOVEMENT ANALYSIS:  Revealed the patient did not have any  periodic limb movements. The patient had a total of 18 spontaneous  arousals with a spontaneous arousal index of 3. OXYGEN SATURATION MONITORING:  Revealed the patient had a maximum oxygen  desaturation to 90% with a mean oxygen saturation of 93%. The patient's  oxygen saturation remained more than 88% during the entire sleep study. EKG MONITORING:  Revealed normal sinus rhythm. The patient found to have a mild-to-severe snoring during the sleep  study. IMPRESSION:  1. Mild obstructive sleep apnea with worsening of respiratory events in  supine position (the sleep study was scored by using AASM 1A hypopnea  rule-3% desaturation criteria).   2.  Decreased and delayed REM sleep limiting the interpretation of the  sleep study. 3.  Hypersomnia with [place] Sleepiness Score of 14, most likely due to  sleep apnea. RECOMMENDATIONS:  1. For the patient's sleep-disordered breathing, the patient needs  treatment. 2.  If the patient choose to go for CPAP titration as a treatment, the  patient should be scheduled for CPAP titration as soon as possible. The  Patient should be scheduled for followup with my clinic in six to eight weeks on recommended  CPAP therapy for clinical reevaluation with review of download. 3.  If the patient wishes to discuss his sleep study reports, the  patient should be scheduled for followup with my clinic as soon as  possible. Thanks to Tristen Waggoner DO, for giving me this opportunity to  participate in the care of this pleasant gentleman.         Victoria Spears MD    D: 09/08/2022 16:53:10       T: 09/09/2022 6:54:29     [email or web address]  Job#: [id number]     Doc#: [id number]    CC:

## 2022-09-12 ENCOUNTER — TELEPHONE (OUTPATIENT)
Dept: SLEEP CENTER | Age: 27
End: 2022-09-12

## 2022-09-12 NOTE — TELEPHONE ENCOUNTER
I spoke with Areli Morales, he would like to discuss the findings of the study at scheduled f/u appointment.

## 2022-09-16 ENCOUNTER — OFFICE VISIT (OUTPATIENT)
Dept: PULMONOLOGY | Age: 27
End: 2022-09-16
Payer: OTHER GOVERNMENT

## 2022-09-16 VITALS
SYSTOLIC BLOOD PRESSURE: 118 MMHG | BODY MASS INDEX: 29.25 KG/M2 | HEART RATE: 76 BPM | OXYGEN SATURATION: 96 % | WEIGHT: 193 LBS | DIASTOLIC BLOOD PRESSURE: 72 MMHG | HEIGHT: 68 IN | TEMPERATURE: 97.7 F

## 2022-09-16 DIAGNOSIS — G47.10 HYPERSOMNIA: ICD-10-CM

## 2022-09-16 DIAGNOSIS — G47.33 OSA (OBSTRUCTIVE SLEEP APNEA): Primary | ICD-10-CM

## 2022-09-16 DIAGNOSIS — R06.81 WITNESSED EPISODE OF APNEA: ICD-10-CM

## 2022-09-16 DIAGNOSIS — J30.9 ALLERGIC RHINITIS, UNSPECIFIED SEASONALITY, UNSPECIFIED TRIGGER: ICD-10-CM

## 2022-09-16 PROCEDURE — 99214 OFFICE O/P EST MOD 30 MIN: CPT | Performed by: INTERNAL MEDICINE

## 2022-09-16 RX ORDER — FLUTICASONE PROPIONATE 50 MCG
2 SPRAY, SUSPENSION (ML) NASAL DAILY
Qty: 16 G | Refills: 11 | Status: SHIPPED | OUTPATIENT
Start: 2022-09-16 | End: 2023-09-16

## 2022-09-16 NOTE — PATIENT INSTRUCTIONS
Recommendations/Plan:  - Start patient on Flonase 50mcg/INH 2sprays each nostril daily. He  was informed about adverse effects of Flonase. He was demonstrated in my office how to use Flonase. He verbalizes understanding.  -I had a discussion with patient regarding avialable treatment options for his sleep disorder breathing including but not limited to CPAP titration in the sleep lab Vs.Dental appliance placement with referral to a local dentist Vs other available surgical options including Uvulopalatopharyngoplasty, maxillomandibular ostomy, inspire device placement and tracheostomy as last option. At the end of discussion, he decided to go for dental appliance as a treatment for his mild obstructive sleep apnea due to associated hypersomnia. -Schedule patient for Dental medicine consult with [facility] for further evaluation and placement of Dental appliance for his obstructive sleep apnea.  -He advised to keep good compliance with recommended dental device therapy to get optimal results and clinical improvement.  -Cody Bearden was informed to call for follow up with my clinic in 3 months after undergoing dental appliance placement for clinical reevaluation and to schedule for base line sleep study with dental device in place to check for residual sleep apnea. -He was advised to continue to practice good sleep hygiene practices.  -He was advised to loose weight by controlling diet and doing exercise.  -He was instructed to not to drive any motor vehicles or operate heavy equipment if he feels sleepy. -Cody Bearden was educated about my impression and plan. He verbalizes understanding.

## 2022-09-16 NOTE — PROGRESS NOTES
Chief Complaint: Salomón Mora is here for psg results    Mallampati airway Class:4  Neck Circumference:.16.75 Inches    [place] sleepiness score 9/16/22: 13  Sleep apnea quality of life questionnaire:.60

## 2023-07-10 ENCOUNTER — HOSPITAL ENCOUNTER (EMERGENCY)
Age: 28
Discharge: HOME | End: 2023-07-10
Payer: OTHER GOVERNMENT

## 2023-07-10 VITALS
WEIGHT: 190 LBS | RESPIRATION RATE: 18 BRPM | DIASTOLIC BLOOD PRESSURE: 87 MMHG | OXYGEN SATURATION: 98 % | SYSTOLIC BLOOD PRESSURE: 129 MMHG | HEIGHT: 68 IN | BODY MASS INDEX: 28.79 KG/M2 | TEMPERATURE: 98.6 F | HEART RATE: 79 BPM

## 2023-07-10 DIAGNOSIS — Z77.098 EXPOSURE TO CHEMICAL IRRITANT: Primary | ICD-10-CM

## 2023-07-10 PROCEDURE — 99213 OFFICE O/P EST LOW 20 MIN: CPT

## 2023-07-10 PROCEDURE — 99212 OFFICE O/P EST SF 10 MIN: CPT | Performed by: EMERGENCY MEDICINE

## 2023-07-10 ASSESSMENT — PAIN SCALES - GENERAL: PAINLEVEL_OUTOF10: 3

## 2023-07-10 ASSESSMENT — PAIN DESCRIPTION - ORIENTATION: ORIENTATION: LEFT

## 2023-07-10 ASSESSMENT — PAIN DESCRIPTION - DESCRIPTORS: DESCRIPTORS: ACHING

## 2023-07-10 ASSESSMENT — PAIN - FUNCTIONAL ASSESSMENT: PAIN_FUNCTIONAL_ASSESSMENT: 0-10

## 2023-07-10 NOTE — ED NOTES
To room with c/o concerns for possible hydraulic fluid injection injury at work today sometime between 2p-3p. He was wearing  gloves. He says it \"feels weird\" No redness, swelling, or disruption of the skin. Declines workers comp claims at this time.   Celsa Campuzano RN  07/10/23 462 Inez Godoy RN  07/10/23 1956

## 2023-07-11 NOTE — DISCHARGE INSTRUCTIONS
Tylenol or ibuprofen as needed for discomfort    Return for increased redness, swelling, problems moving her thumb, change of color or decreased capillary refill to your thumb, or any new concerns

## 2024-02-12 ENCOUNTER — HOSPITAL ENCOUNTER (EMERGENCY)
Age: 29
Discharge: HOME | End: 2024-02-12
Payer: OTHER GOVERNMENT

## 2024-02-12 VITALS
WEIGHT: 180 LBS | RESPIRATION RATE: 20 BRPM | HEART RATE: 85 BPM | DIASTOLIC BLOOD PRESSURE: 79 MMHG | OXYGEN SATURATION: 98 % | BODY MASS INDEX: 27.37 KG/M2 | SYSTOLIC BLOOD PRESSURE: 131 MMHG | TEMPERATURE: 98 F

## 2024-02-12 DIAGNOSIS — J01.00 ACUTE NON-RECURRENT MAXILLARY SINUSITIS: Primary | ICD-10-CM

## 2024-02-12 PROCEDURE — 99213 OFFICE O/P EST LOW 20 MIN: CPT

## 2024-02-12 RX ORDER — AMOXICILLIN AND CLAVULANATE POTASSIUM 875; 125 MG/1; MG/1
1 TABLET, FILM COATED ORAL 2 TIMES DAILY
Qty: 20 TABLET | Refills: 0 | Status: SHIPPED | OUTPATIENT
Start: 2024-02-12 | End: 2024-02-22

## 2024-02-12 ASSESSMENT — PAIN DESCRIPTION - PAIN TYPE: TYPE: ACUTE PAIN

## 2024-02-12 ASSESSMENT — PAIN DESCRIPTION - FREQUENCY: FREQUENCY: CONTINUOUS

## 2024-02-12 ASSESSMENT — PAIN DESCRIPTION - LOCATION: LOCATION: EAR

## 2024-02-12 ASSESSMENT — PAIN DESCRIPTION - DESCRIPTORS: DESCRIPTORS: ACHING;PRESSURE

## 2024-02-12 ASSESSMENT — PAIN - FUNCTIONAL ASSESSMENT: PAIN_FUNCTIONAL_ASSESSMENT: 0-10

## 2024-02-12 ASSESSMENT — PAIN SCALES - GENERAL: PAINLEVEL_OUTOF10: 4

## 2024-02-12 ASSESSMENT — PAIN DESCRIPTION - ORIENTATION: ORIENTATION: LEFT

## 2024-02-13 RX ORDER — PREDNISONE 20 MG/1
20 TABLET ORAL 2 TIMES DAILY
Qty: 10 TABLET | Refills: 0 | Status: SHIPPED | OUTPATIENT
Start: 2024-02-13 | End: 2024-02-18

## 2024-02-13 NOTE — ED PROVIDER NOTES
[facility] URGENT CARE  Urgent Care Encounter      CHIEF COMPLAINT       Chief Complaint   Patient presents with    Otalgia     (B)    Head Congestion       Nurses Notes reviewed and I agree except as noted in the HPI.  HISTORY OF PRESENT ILLNESS   Hieu Denny is a 28 y.o. male who presents to urgent care with complaints of bilateral ear pain, head congestion, sinus pressure.  Patient reports his symptoms have been ongoing for 2 to 3 weeks.  Patient reports he has been taking a daily Claritin as well as Mucinex DM.  Patient denies fevers, dizziness, abdominal pain, diarrhea or vomiting.  Patient denies being around anyone sick recently.    REVIEW OF SYSTEMS     Review of Systems   Constitutional:  Negative for fatigue and fever.   HENT:  Positive for congestion, ear pain, postnasal drip, sinus pressure and sinus pain.    Respiratory:  Negative for cough, shortness of breath and wheezing.    Cardiovascular:  Negative for chest pain.   Gastrointestinal:  Negative for abdominal pain, diarrhea, nausea and vomiting.   Musculoskeletal:  Negative for arthralgias.   Neurological:  Negative for dizziness, seizures, numbness and headaches.       PAST MEDICAL HISTORY   History reviewed. No pertinent past medical history.    SURGICAL HISTORY     Patient  has a past surgical history that includes LASIK.    CURRENT MEDICATIONS       Discharge Medication List as of 2/12/2024  7:11 PM        CONTINUE these medications which have NOT CHANGED    Details   fluticasone (FLONASE) 50 MCG/ACT nasal spray 2 sprays by Nasal route daily, Disp-16 g, R-11Normal             ALLERGIES     Patient is has No Known Allergies.    FAMILY HISTORY     Patient'sfamily history includes Heart Attack in his mother.    SOCIAL HISTORY     Patient  reports that he has never smoked. He has never used smokeless tobacco. He reports current alcohol use. He reports that he does not use drugs.    PHYSICAL EXAM     ED TRIAGE VITALS  BP: 131/79, Temp:

## 2024-02-13 NOTE — ED TRIAGE NOTES
Patient to room with c/o left ear pressure and discomfort along with congestion and pressure to left side of face beginning \"months\" ago, increasing over the last day.

## 2024-05-25 ENCOUNTER — HOSPITAL ENCOUNTER (OUTPATIENT)
Age: 29
Discharge: HOME | End: 2024-05-25
Payer: OTHER GOVERNMENT

## 2024-05-25 ENCOUNTER — HOSPITAL ENCOUNTER (OUTPATIENT)
Dept: GENERAL RADIOLOGY | Age: 29
Discharge: HOME | End: 2024-05-25
Payer: OTHER GOVERNMENT

## 2024-05-25 DIAGNOSIS — R52 PAIN: ICD-10-CM

## 2024-05-25 PROCEDURE — 72040 X-RAY EXAM NECK SPINE 2-3 VW: CPT

## 2025-05-13 ENCOUNTER — APPOINTMENT (OUTPATIENT)
Dept: CT IMAGING | Age: 30
End: 2025-05-13
Payer: OTHER GOVERNMENT

## 2025-05-13 ENCOUNTER — ANESTHESIA EVENT (OUTPATIENT)
Dept: OPERATING ROOM | Age: 30
End: 2025-05-13
Payer: OTHER GOVERNMENT

## 2025-05-13 ENCOUNTER — ANESTHESIA (OUTPATIENT)
Dept: OPERATING ROOM | Age: 30
End: 2025-05-13
Payer: OTHER GOVERNMENT

## 2025-05-13 ENCOUNTER — HOSPITAL ENCOUNTER (OUTPATIENT)
Age: 30
Setting detail: OBSERVATION
Discharge: HOME | End: 2025-05-13
Attending: EMERGENCY MEDICINE | Admitting: SURGERY
Payer: OTHER GOVERNMENT

## 2025-05-13 VITALS
TEMPERATURE: 98.4 F | OXYGEN SATURATION: 100 % | BODY MASS INDEX: 27.98 KG/M2 | SYSTOLIC BLOOD PRESSURE: 134 MMHG | DIASTOLIC BLOOD PRESSURE: 70 MMHG | WEIGHT: 184 LBS | HEART RATE: 108 BPM | RESPIRATION RATE: 17 BRPM

## 2025-05-13 DIAGNOSIS — R10.84 GENERALIZED ABDOMINAL PAIN: Primary | ICD-10-CM

## 2025-05-13 DIAGNOSIS — Z90.49 S/P LAPAROSCOPIC APPENDECTOMY: ICD-10-CM

## 2025-05-13 DIAGNOSIS — R10.9 ABDOMINAL PAIN, UNSPECIFIED ABDOMINAL LOCATION: ICD-10-CM

## 2025-05-13 DIAGNOSIS — R10.33 PERIUMBILICAL ABDOMINAL PAIN: ICD-10-CM

## 2025-05-13 PROBLEM — K35.30 ACUTE APPENDICITIS WITH LOCALIZED PERITONITIS, WITHOUT PERFORATION, ABSCESS, OR GANGRENE: Status: ACTIVE | Noted: 2025-05-13

## 2025-05-13 PROBLEM — K35.32 ACUTE APPENDICITIS WITH PERFORATION AND LOCALIZED PERITONITIS, WITHOUT ABSCESS OR GANGRENE: Status: ACTIVE | Noted: 2025-05-13

## 2025-05-13 LAB
ALBUMIN SERPL BCG-MCNC: 4.6 G/DL (ref 3.4–4.9)
ALP SERPL-CCNC: 69 U/L (ref 40–129)
ALT SERPL W/O P-5'-P-CCNC: 21 U/L (ref 10–50)
ANION GAP SERPL CALC-SCNC: 12 MEQ/L (ref 8–16)
AST SERPL-CCNC: 29 U/L (ref 10–50)
BASOPHILS ABSOLUTE: 0.1 THOU/MM3 (ref 0–0.1)
BASOPHILS NFR BLD AUTO: 0.5 %
BILIRUB CONJ SERPL-MCNC: < 0.1 MG/DL (ref 0–0.2)
BILIRUB SERPL-MCNC: 0.4 MG/DL (ref 0.3–1.2)
BUN SERPL-MCNC: 11 MG/DL (ref 8–23)
CALCIUM SERPL-MCNC: 9.7 MG/DL (ref 8.6–10)
CHLORIDE SERPL-SCNC: 101 MEQ/L (ref 98–111)
CO2 SERPL-SCNC: 26 MEQ/L (ref 22–29)
CREAT SERPL-MCNC: 0.8 MG/DL (ref 0.7–1.2)
DEPRECATED RDW RBC AUTO: 38.6 FL (ref 35–45)
EOSINOPHIL NFR BLD AUTO: 3.7 %
EOSINOPHILS ABSOLUTE: 0.5 THOU/MM3 (ref 0–0.4)
ERYTHROCYTE [DISTWIDTH] IN BLOOD BY AUTOMATED COUNT: 12.1 % (ref 11.5–14.5)
GFR SERPL CREATININE-BSD FRML MDRD: > 90 ML/MIN/1.73M2
GLUCOSE SERPL-MCNC: 108 MG/DL (ref 74–109)
HCT VFR BLD AUTO: 44.4 % (ref 42–52)
HGB BLD-MCNC: 15.6 GM/DL (ref 14–18)
IMM GRANULOCYTES # BLD AUTO: 0.06 THOU/MM3 (ref 0–0.07)
IMM GRANULOCYTES NFR BLD AUTO: 0.5 %
LIPASE SERPL-CCNC: 25 U/L (ref 13–60)
LYMPHOCYTES ABSOLUTE: 1.6 THOU/MM3 (ref 1–4.8)
LYMPHOCYTES NFR BLD AUTO: 12.2 %
MAGNESIUM SERPL-MCNC: 2 MG/DL (ref 1.6–2.6)
MCH RBC QN AUTO: 31.1 PG (ref 26–33)
MCHC RBC AUTO-ENTMCNC: 35.1 GM/DL (ref 32.2–35.5)
MCV RBC AUTO: 88.4 FL (ref 80–94)
MONOCYTES ABSOLUTE: 0.8 THOU/MM3 (ref 0.4–1.3)
MONOCYTES NFR BLD AUTO: 6 %
NEUTROPHILS ABSOLUTE: 10.2 THOU/MM3 (ref 1.8–7.7)
NEUTROPHILS NFR BLD AUTO: 77.1 %
NRBC BLD AUTO-RTO: 0 /100 WBC
OSMOLALITY SERPL CALC.SUM OF ELEC: 277.5 MOSMOL/KG (ref 275–300)
PLATELET # BLD AUTO: 251 THOU/MM3 (ref 130–400)
PMV BLD AUTO: 10.1 FL (ref 9.4–12.4)
POTASSIUM SERPL-SCNC: 4.1 MEQ/L (ref 3.5–5.2)
PROT SERPL-MCNC: 7.3 G/DL (ref 6.4–8.3)
RBC # BLD AUTO: 5.02 MILL/MM3 (ref 4.7–6.1)
SODIUM SERPL-SCNC: 139 MEQ/L (ref 135–145)
WBC # BLD AUTO: 13.2 THOU/MM3 (ref 4.8–10.8)

## 2025-05-13 PROCEDURE — 99215 OFFICE O/P EST HI 40 MIN: CPT

## 2025-05-13 PROCEDURE — 2580000003 HC RX 258

## 2025-05-13 PROCEDURE — 6360000002 HC RX W HCPCS: Performed by: SURGERY

## 2025-05-13 PROCEDURE — 3600000003 HC SURGERY LEVEL 3 BASE: Performed by: SURGERY

## 2025-05-13 PROCEDURE — 80053 COMPREHEN METABOLIC PANEL: CPT

## 2025-05-13 PROCEDURE — 2500000003 HC RX 250 WO HCPCS: Performed by: NURSE ANESTHETIST, CERTIFIED REGISTERED

## 2025-05-13 PROCEDURE — 36415 COLL VENOUS BLD VENIPUNCTURE: CPT

## 2025-05-13 PROCEDURE — 6360000002 HC RX W HCPCS

## 2025-05-13 PROCEDURE — 3600000013 HC SURGERY LEVEL 3 ADDTL 15MIN: Performed by: SURGERY

## 2025-05-13 PROCEDURE — 99222 1ST HOSP IP/OBS MODERATE 55: CPT | Performed by: SURGERY

## 2025-05-13 PROCEDURE — 6360000004 HC RX CONTRAST MEDICATION

## 2025-05-13 PROCEDURE — 83690 ASSAY OF LIPASE: CPT

## 2025-05-13 PROCEDURE — 6370000000 HC RX 637 (ALT 250 FOR IP)

## 2025-05-13 PROCEDURE — 6360000002 HC RX W HCPCS: Performed by: ANESTHESIOLOGY

## 2025-05-13 PROCEDURE — 88304 TISSUE EXAM BY PATHOLOGIST: CPT

## 2025-05-13 PROCEDURE — 2500000003 HC RX 250 WO HCPCS

## 2025-05-13 PROCEDURE — 3700000000 HC ANESTHESIA ATTENDED CARE: Performed by: SURGERY

## 2025-05-13 PROCEDURE — 7100000001 HC PACU RECOVERY - ADDTL 15 MIN: Performed by: SURGERY

## 2025-05-13 PROCEDURE — 3700000001 HC ADD 15 MINUTES (ANESTHESIA): Performed by: SURGERY

## 2025-05-13 PROCEDURE — 2580000003 HC RX 258: Performed by: SURGERY

## 2025-05-13 PROCEDURE — 2709999900 HC NON-CHARGEABLE SUPPLY: Performed by: SURGERY

## 2025-05-13 PROCEDURE — G0378 HOSPITAL OBSERVATION PER HR: HCPCS

## 2025-05-13 PROCEDURE — 6360000002 HC RX W HCPCS: Performed by: NURSE ANESTHETIST, CERTIFIED REGISTERED

## 2025-05-13 PROCEDURE — 99215 OFFICE O/P EST HI 40 MIN: CPT | Performed by: NURSE PRACTITIONER

## 2025-05-13 PROCEDURE — 74177 CT ABD & PELVIS W/CONTRAST: CPT

## 2025-05-13 PROCEDURE — 96374 THER/PROPH/DIAG INJ IV PUSH: CPT

## 2025-05-13 PROCEDURE — 85025 COMPLETE CBC W/AUTO DIFF WBC: CPT

## 2025-05-13 PROCEDURE — 2720000010 HC SURG SUPPLY STERILE: Performed by: SURGERY

## 2025-05-13 PROCEDURE — 83735 ASSAY OF MAGNESIUM: CPT

## 2025-05-13 PROCEDURE — 96375 TX/PRO/DX INJ NEW DRUG ADDON: CPT

## 2025-05-13 PROCEDURE — 99285 EMERGENCY DEPT VISIT HI MDM: CPT

## 2025-05-13 PROCEDURE — 7100000000 HC PACU RECOVERY - FIRST 15 MIN: Performed by: SURGERY

## 2025-05-13 RX ORDER — HYDROCODONE BITARTRATE AND ACETAMINOPHEN 5; 325 MG/1; MG/1
2 TABLET ORAL EVERY 4 HOURS PRN
Status: DISCONTINUED | OUTPATIENT
Start: 2025-05-13 | End: 2025-05-13 | Stop reason: HOSPADM

## 2025-05-13 RX ORDER — SODIUM CHLORIDE 0.9 % (FLUSH) 0.9 %
5-40 SYRINGE (ML) INJECTION PRN
Status: DISCONTINUED | OUTPATIENT
Start: 2025-05-13 | End: 2025-05-13 | Stop reason: HOSPADM

## 2025-05-13 RX ORDER — SODIUM CHLORIDE 9 MG/ML
INJECTION, SOLUTION INTRAVENOUS PRN
Status: DISCONTINUED | OUTPATIENT
Start: 2025-05-13 | End: 2025-05-13 | Stop reason: SDUPTHER

## 2025-05-13 RX ORDER — SODIUM CHLORIDE 0.9 % (FLUSH) 0.9 %
5-40 SYRINGE (ML) INJECTION EVERY 12 HOURS SCHEDULED
Status: DISCONTINUED | OUTPATIENT
Start: 2025-05-13 | End: 2025-05-13 | Stop reason: HOSPADM

## 2025-05-13 RX ORDER — SODIUM CHLORIDE 9 MG/ML
INJECTION, SOLUTION INTRAVENOUS PRN
Status: DISCONTINUED | OUTPATIENT
Start: 2025-05-13 | End: 2025-05-13 | Stop reason: HOSPADM

## 2025-05-13 RX ORDER — NALOXONE HYDROCHLORIDE 0.4 MG/ML
INJECTION, SOLUTION INTRAMUSCULAR; INTRAVENOUS; SUBCUTANEOUS PRN
Status: DISCONTINUED | OUTPATIENT
Start: 2025-05-13 | End: 2025-05-13 | Stop reason: HOSPADM

## 2025-05-13 RX ORDER — ROCURONIUM BROMIDE 10 MG/ML
INJECTION, SOLUTION INTRAVENOUS
Status: DISCONTINUED | OUTPATIENT
Start: 2025-05-13 | End: 2025-05-13 | Stop reason: SDUPTHER

## 2025-05-13 RX ORDER — MEPERIDINE HYDROCHLORIDE 25 MG/ML
12.5 INJECTION INTRAMUSCULAR; INTRAVENOUS; SUBCUTANEOUS EVERY 5 MIN PRN
Status: DISCONTINUED | OUTPATIENT
Start: 2025-05-13 | End: 2025-05-13 | Stop reason: HOSPADM

## 2025-05-13 RX ORDER — BUPIVACAINE HYDROCHLORIDE 2.5 MG/ML
INJECTION, SOLUTION EPIDURAL; INFILTRATION; INTRACAUDAL; PERINEURAL PRN
Status: DISCONTINUED | OUTPATIENT
Start: 2025-05-13 | End: 2025-05-13 | Stop reason: ALTCHOICE

## 2025-05-13 RX ORDER — DEXAMETHASONE SODIUM PHOSPHATE 4 MG/ML
INJECTION, SOLUTION INTRA-ARTICULAR; INTRALESIONAL; INTRAMUSCULAR; INTRAVENOUS; SOFT TISSUE
Status: DISCONTINUED | OUTPATIENT
Start: 2025-05-13 | End: 2025-05-13 | Stop reason: SDUPTHER

## 2025-05-13 RX ORDER — ONDANSETRON 2 MG/ML
4 INJECTION INTRAMUSCULAR; INTRAVENOUS EVERY 6 HOURS PRN
Status: DISCONTINUED | OUTPATIENT
Start: 2025-05-13 | End: 2025-05-13 | Stop reason: HOSPADM

## 2025-05-13 RX ORDER — FENTANYL CITRATE 50 UG/ML
50 INJECTION, SOLUTION INTRAMUSCULAR; INTRAVENOUS EVERY 5 MIN PRN
Status: DISCONTINUED | OUTPATIENT
Start: 2025-05-13 | End: 2025-05-13 | Stop reason: HOSPADM

## 2025-05-13 RX ORDER — IOPAMIDOL 755 MG/ML
80 INJECTION, SOLUTION INTRAVASCULAR
Status: COMPLETED | OUTPATIENT
Start: 2025-05-13 | End: 2025-05-13

## 2025-05-13 RX ORDER — HYDROCODONE BITARTRATE AND ACETAMINOPHEN 5; 325 MG/1; MG/1
1 TABLET ORAL EVERY 6 HOURS PRN
Qty: 28 TABLET | Refills: 0 | Status: SHIPPED | OUTPATIENT
Start: 2025-05-13 | End: 2025-05-20

## 2025-05-13 RX ORDER — HYDROCODONE BITARTRATE AND ACETAMINOPHEN 5; 325 MG/1; MG/1
1 TABLET ORAL EVERY 6 HOURS PRN
Qty: 20 TABLET | Refills: 0 | Status: SHIPPED | OUTPATIENT
Start: 2025-05-13 | End: 2025-05-13

## 2025-05-13 RX ORDER — ONDANSETRON 2 MG/ML
4 INJECTION INTRAMUSCULAR; INTRAVENOUS EVERY 6 HOURS PRN
Status: DISCONTINUED | OUTPATIENT
Start: 2025-05-13 | End: 2025-05-13 | Stop reason: SDUPTHER

## 2025-05-13 RX ORDER — LIDOCAINE HYDROCHLORIDE 20 MG/ML
INJECTION, SOLUTION INTRAVENOUS
Status: DISCONTINUED | OUTPATIENT
Start: 2025-05-13 | End: 2025-05-13 | Stop reason: SDUPTHER

## 2025-05-13 RX ORDER — FENTANYL CITRATE 50 UG/ML
INJECTION, SOLUTION INTRAMUSCULAR; INTRAVENOUS
Status: DISCONTINUED | OUTPATIENT
Start: 2025-05-13 | End: 2025-05-13 | Stop reason: SDUPTHER

## 2025-05-13 RX ORDER — BUPIVACAINE HYDROCHLORIDE 5 MG/ML
INJECTION, SOLUTION EPIDURAL; INTRACAUDAL; PERINEURAL PRN
Status: DISCONTINUED | OUTPATIENT
Start: 2025-05-13 | End: 2025-05-13 | Stop reason: ALTCHOICE

## 2025-05-13 RX ORDER — METRONIDAZOLE 500 MG/100ML
500 INJECTION, SOLUTION INTRAVENOUS ONCE
Status: COMPLETED | OUTPATIENT
Start: 2025-05-13 | End: 2025-05-13

## 2025-05-13 RX ORDER — ONDANSETRON 2 MG/ML
INJECTION INTRAMUSCULAR; INTRAVENOUS
Status: DISCONTINUED | OUTPATIENT
Start: 2025-05-13 | End: 2025-05-13 | Stop reason: SDUPTHER

## 2025-05-13 RX ORDER — ONDANSETRON 4 MG/1
4 TABLET, ORALLY DISINTEGRATING ORAL EVERY 8 HOURS PRN
Status: DISCONTINUED | OUTPATIENT
Start: 2025-05-13 | End: 2025-05-13 | Stop reason: HOSPADM

## 2025-05-13 RX ORDER — KETOROLAC TROMETHAMINE 30 MG/ML
30 INJECTION, SOLUTION INTRAMUSCULAR; INTRAVENOUS EVERY 6 HOURS
Status: DISCONTINUED | OUTPATIENT
Start: 2025-05-13 | End: 2025-05-13 | Stop reason: HOSPADM

## 2025-05-13 RX ORDER — SODIUM CHLORIDE, SODIUM LACTATE, POTASSIUM CHLORIDE, CALCIUM CHLORIDE 600; 310; 30; 20 MG/100ML; MG/100ML; MG/100ML; MG/100ML
INJECTION, SOLUTION INTRAVENOUS CONTINUOUS
Status: DISCONTINUED | OUTPATIENT
Start: 2025-05-13 | End: 2025-05-13 | Stop reason: HOSPADM

## 2025-05-13 RX ORDER — MIDAZOLAM HYDROCHLORIDE 1 MG/ML
INJECTION, SOLUTION INTRAMUSCULAR; INTRAVENOUS
Status: DISCONTINUED | OUTPATIENT
Start: 2025-05-13 | End: 2025-05-13 | Stop reason: SDUPTHER

## 2025-05-13 RX ORDER — ONDANSETRON 2 MG/ML
4 INJECTION INTRAMUSCULAR; INTRAVENOUS
Status: DISCONTINUED | OUTPATIENT
Start: 2025-05-13 | End: 2025-05-13 | Stop reason: HOSPADM

## 2025-05-13 RX ORDER — ONDANSETRON 4 MG/1
4 TABLET, ORALLY DISINTEGRATING ORAL EVERY 8 HOURS PRN
Status: DISCONTINUED | OUTPATIENT
Start: 2025-05-13 | End: 2025-05-13 | Stop reason: SDUPTHER

## 2025-05-13 RX ORDER — HYDROCODONE BITARTRATE AND ACETAMINOPHEN 5; 325 MG/1; MG/1
1 TABLET ORAL EVERY 6 HOURS PRN
Refills: 0 | Status: DISCONTINUED | OUTPATIENT
Start: 2025-05-13 | End: 2025-05-13 | Stop reason: HOSPADM

## 2025-05-13 RX ORDER — HYDROCODONE BITARTRATE AND ACETAMINOPHEN 5; 325 MG/1; MG/1
1 TABLET ORAL EVERY 4 HOURS PRN
Status: DISCONTINUED | OUTPATIENT
Start: 2025-05-13 | End: 2025-05-13 | Stop reason: HOSPADM

## 2025-05-13 RX ORDER — 0.9 % SODIUM CHLORIDE 0.9 %
1000 INTRAVENOUS SOLUTION INTRAVENOUS ONCE
Status: COMPLETED | OUTPATIENT
Start: 2025-05-13 | End: 2025-05-13

## 2025-05-13 RX ORDER — PROPOFOL 10 MG/ML
INJECTION, EMULSION INTRAVENOUS
Status: DISCONTINUED | OUTPATIENT
Start: 2025-05-13 | End: 2025-05-13 | Stop reason: SDUPTHER

## 2025-05-13 RX ORDER — FENTANYL CITRATE 50 UG/ML
50 INJECTION, SOLUTION INTRAMUSCULAR; INTRAVENOUS ONCE
Status: COMPLETED | OUTPATIENT
Start: 2025-05-13 | End: 2025-05-13

## 2025-05-13 RX ADMIN — LIDOCAINE HYDROCHLORIDE: 20 SOLUTION ORAL at 10:00

## 2025-05-13 RX ADMIN — IOPAMIDOL 80 ML: 755 INJECTION, SOLUTION INTRAVENOUS at 09:50

## 2025-05-13 RX ADMIN — HYDROMORPHONE HYDROCHLORIDE 0.5 MG: 1 INJECTION, SOLUTION INTRAMUSCULAR; INTRAVENOUS; SUBCUTANEOUS at 12:30

## 2025-05-13 RX ADMIN — Medication 100 MG: at 11:31

## 2025-05-13 RX ADMIN — HYDROMORPHONE HYDROCHLORIDE 0.5 MG: 1 INJECTION, SOLUTION INTRAMUSCULAR; INTRAVENOUS; SUBCUTANEOUS at 12:35

## 2025-05-13 RX ADMIN — FAMOTIDINE 20 MG: 10 INJECTION, SOLUTION INTRAVENOUS at 10:00

## 2025-05-13 RX ADMIN — ONDANSETRON 4 MG: 2 INJECTION, SOLUTION INTRAMUSCULAR; INTRAVENOUS at 11:48

## 2025-05-13 RX ADMIN — PROPOFOL 200 MG: 10 INJECTION, EMULSION INTRAVENOUS at 11:31

## 2025-05-13 RX ADMIN — ROCURONIUM BROMIDE 50 MG: 10 INJECTION, SOLUTION INTRAVENOUS at 11:31

## 2025-05-13 RX ADMIN — SODIUM CHLORIDE 1000 ML: 9 INJECTION, SOLUTION INTRAVENOUS at 10:05

## 2025-05-13 RX ADMIN — SUGAMMADEX 300 MG: 100 INJECTION, SOLUTION INTRAVENOUS at 12:00

## 2025-05-13 RX ADMIN — FENTANYL CITRATE 50 MCG: 50 INJECTION INTRAMUSCULAR; INTRAVENOUS at 10:00

## 2025-05-13 RX ADMIN — FENTANYL CITRATE 50 MCG: 50 INJECTION, SOLUTION INTRAMUSCULAR; INTRAVENOUS at 12:08

## 2025-05-13 RX ADMIN — FENTANYL CITRATE 50 MCG: 50 INJECTION, SOLUTION INTRAMUSCULAR; INTRAVENOUS at 11:31

## 2025-05-13 RX ADMIN — DEXAMETHASONE SODIUM PHOSPHATE 8 MG: 4 INJECTION, SOLUTION INTRAMUSCULAR; INTRAVENOUS at 11:48

## 2025-05-13 RX ADMIN — SODIUM CHLORIDE 300 ML: 9 INJECTION, SOLUTION INTRAVENOUS at 12:06

## 2025-05-13 RX ADMIN — SODIUM CHLORIDE 1000 ML: 9 INJECTION, SOLUTION INTRAVENOUS at 11:26

## 2025-05-13 RX ADMIN — MIDAZOLAM 2 MG: 1 INJECTION INTRAMUSCULAR; INTRAVENOUS at 11:25

## 2025-05-13 RX ADMIN — METRONIDAZOLE 500 MG: 500 SOLUTION INTRAVENOUS at 11:38

## 2025-05-13 RX ADMIN — WATER 1000 MG: 1 INJECTION INTRAMUSCULAR; INTRAVENOUS; SUBCUTANEOUS at 11:31

## 2025-05-13 ASSESSMENT — PAIN SCALES - GENERAL
PAINLEVEL_OUTOF10: 2
PAINLEVEL_OUTOF10: 7
PAINLEVEL_OUTOF10: 7
PAINLEVEL_OUTOF10: 2
PAINLEVEL_OUTOF10: 7

## 2025-05-13 ASSESSMENT — ENCOUNTER SYMPTOMS
RHINORRHEA: 0
ABDOMINAL PAIN: 1
SHORTNESS OF BREATH: 0
SORE THROAT: 0
DIARRHEA: 0
CHEST TIGHTNESS: 0
CONSTIPATION: 0
VOMITING: 0
COUGH: 0
NAUSEA: 0

## 2025-05-13 ASSESSMENT — PAIN SCALES - WONG BAKER
WONGBAKER_NUMERICALRESPONSE: HURTS A LITTLE BIT
WONGBAKER_NUMERICALRESPONSE: NO HURT

## 2025-05-13 ASSESSMENT — PAIN - FUNCTIONAL ASSESSMENT
PAIN_FUNCTIONAL_ASSESSMENT: 0-10
PAIN_FUNCTIONAL_ASSESSMENT: ACTIVITIES ARE NOT PREVENTED
PAIN_FUNCTIONAL_ASSESSMENT: NONE - DENIES PAIN

## 2025-05-13 ASSESSMENT — PAIN DESCRIPTION - LOCATION
LOCATION: ABDOMEN

## 2025-05-13 ASSESSMENT — PAIN DESCRIPTION - ONSET: ONSET: ON-GOING

## 2025-05-13 ASSESSMENT — PAIN DESCRIPTION - FREQUENCY
FREQUENCY: CONTINUOUS
FREQUENCY: CONTINUOUS

## 2025-05-13 ASSESSMENT — PAIN DESCRIPTION - DESCRIPTORS
DESCRIPTORS: SORE
DESCRIPTORS: SORE;STABBING

## 2025-05-13 ASSESSMENT — PAIN DESCRIPTION - ORIENTATION
ORIENTATION: LOWER
ORIENTATION: LOWER
ORIENTATION: MID

## 2025-05-13 ASSESSMENT — PAIN DESCRIPTION - PAIN TYPE: TYPE: SURGICAL PAIN

## 2025-05-13 NOTE — ED TRIAGE NOTES
Hieu arrives to room with complaint of  mid abd pain started last night  states he is having regular BM, last one yesterday    Has not taken any meds for symptom.     Work note

## 2025-05-13 NOTE — DISCHARGE INSTRUCTIONS
[facility]      DR. NEVAREZ'S DISCHARGE INSTRUCTIONS    Pt Name: Hieu Denny  Medical Record Number: [id number]  Today's Date: 5/13/2025           GENERAL ANESTHESIA OR SEDATION:    [x]  Do not drive or operate hazardous machinery for 24 hours.  [x] Do not make important business or personal decisions for 24 hours.  [x] Do not drink alcoholic beverages or use tobacco for 24 hours.           ACTIVITY INSTRUCTIONS:    [] Rest today. Resume light to normal activity tomorrow.   [x] You may resume normal activity tomorrow. Do not engage in strenuous activity that may place stress on your incision.  [x] Do not drive  UNTIL NO LONGER TAKING NARCOTICS AND DAILY ACTIVITIES  ARE NEARLY NORMAL     [x]Avoid heavy lifting, tugging, pullings greater  than  25 lbs for 2-weeks until seen in the office.               DIET INSTRUCTIONS:    []Begin with clear liquids. If not nauseated, may increase to a low-fat diet when you desire. Greasy and spicy foods are not advised.  [x]Regular diet as tolerated.  []Other:          MEDICATIONS  [x]Prescription sent with you to be used as directed.       Current Discharge Medication List        START taking these medications    Details   HYDROcodone-acetaminophen (NORCO) 5-325 MG per tablet Take 1 tablet by mouth every 6 hours as needed for Pain for up to 5 days. Max Daily Amount: 4 tablets  Qty: 20 tablet, Refills: 0    Comments: Reduce doses taken as pain becomes manageable  Associated Diagnoses: S/P laparoscopic appendectomy              Do not drink alcohol or drive while taking these medications.   You may experience dizziness or drowsiness with these medications.   You may also experience constipation which can be relieved with stool softners or laxatives.    [x]You may resume your daily prescription medication schedule unless otherwise specified.  []Do not take 325mg Aspirin or other blood thinners such as Coumadin or Plavix for 5 days.            WOUND/DRESSING

## 2025-05-13 NOTE — ED PROVIDER NOTES
[facility] URGENT CARE  Urgent Care Encounter       CHIEF COMPLAINT       Chief Complaint   Patient presents with    Abdominal Pain       Nurses Notes reviewed and I agree except as noted in the HPI.  HISTORY OF PRESENT ILLNESS   Hieu Denny is a 29 y.o. male who presents to the [place] urgent care for evaluation of abdominal pain.  Patient reports the pain started last night roughly 7 to 8 PM.  He reports the pain as upper middle and location.  He denies alleviating or aggravating factors.  He denies that it improves or worsens with eating.  He denies previous abdominal surgeries.  Denies dysuria, urgency, frequency, or penile discharge.  Denies constipation or diarrhea.      The history is provided by the patient. No  was used.       REVIEW OF SYSTEMS     Review of Systems   Constitutional:  Negative for activity change, appetite change, chills, fatigue and fever.   HENT:  Negative for ear discharge, ear pain, rhinorrhea and sore throat.    Respiratory:  Negative for cough, chest tightness and shortness of breath.    Cardiovascular:  Negative for chest pain.   Gastrointestinal:  Positive for abdominal pain. Negative for constipation, diarrhea, nausea and vomiting.   Genitourinary:  Negative for dysuria.   Skin:  Negative for rash.   Allergic/Immunologic: Negative for environmental allergies and food allergies.   Neurological:  Negative for dizziness and headaches.       PAST MEDICAL HISTORY   History reviewed. No pertinent past medical history.    SURGICALHISTORY     Patient  has a past surgical history that includes LASIK.    CURRENT MEDICATIONS       Previous Medications    No medications on file       ALLERGIES     Patient is has no known allergies.    Patients   There is no immunization history on file for this patient.    FAMILY HISTORY     Patient's family history includes Heart Attack in his mother.    SOCIAL HISTORY     Patient  reports that he has never smoked. He has never

## 2025-05-13 NOTE — DISCHARGE SUMMARY
[facility]      Discharge Summary    Patient:  Hieu Denny  YOB: 1995  MRN: [id number]   Acct: [id number]    Primary Care Physician: No primary care provider on file.         Admit date:  5/13/2025  8:04 AM        Discharge date:   5/13/2025      Admitting Diagnosis:   Active Hospital Problems    Diagnosis Date Noted    Acute appendicitis with perforation and localized peritonitis, without abscess or gangrene [K35.32] 05/13/2025    S/P laparoscopic appendectomy [Z90.49] 05/13/2025         Discharge Diagnosis:   Active Hospital Problems    Diagnosis Date Noted    Acute appendicitis with perforation and localized peritonitis, without abscess or gangrene [K35.32] 05/13/2025    S/P laparoscopic appendectomy [Z90.49] 05/13/2025         Disposition:Home      Procedures/Diagnostic Test:  appendectomy         Hospital Course: The pt presented with abdominal pain and ct evidence of appendicits. They underwent laparoscopic appendectomy, started a diet, remained stable and was discharged         Discharge Vitals: /70   Pulse (!) 108   Temp 98.4 °F (36.9 °C) (Oral)   Resp 17   Wt 83.5 kg (184 lb)   SpO2 100%   BMI 27.98 kg/m²          Physical Exam:  24 hour intake/output:  Intake/Output Summary (Last 24 hours) at 5/13/2025 1914  Last data filed at 5/13/2025 1206  Gross per 24 hour   Intake 761.2 ml   Output 5 ml   Net 756.2 ml     Last 3 weights:  Wt Readings from Last 3 Encounters:   05/13/25 83.5 kg (184 lb)   02/12/24 81.6 kg (180 lb)   07/10/23 86.2 kg (190 lb)            Allergies:  Patient has no known allergies.         Discharge Medications:        Medication List        START taking these medications      HYDROcodone-acetaminophen 5-325 MG per tablet  Commonly known as: NORCO  Take 1 tablet by mouth every 6 hours as needed for Pain for up to 7 days. Max Daily Amount: 4 tablets               Where to Get Your Medications        These medications were sent to [facility]/pharmacy

## 2025-05-13 NOTE — ED PROVIDER NOTES
[facility] EMERGENCY DEPARTMENT  EMERGENCY DEPARTMENT ENCOUNTER          Pt Name: Hieu Denny  MRN: [id number]  Birthdate 1995  Date of evaluation: 5/13/2025  Physician: Benitez Coy MD  Supervising Attending Physician: Bradley Fink DO       CHIEF COMPLAINT       Chief Complaint   Patient presents with    Abdominal Pain         HISTORY OF PRESENT ILLNESS    HPI  Hieu Denny is a 29 y.o. male who presents to the emergency department from home, by private vehicle for evaluation of abdominal pain.  Patient reports abdominal pain started approximate 7:00 last night, while he was sitting in a chair.  The patient states that the pain is 7/8 out of 10 located in the periumbilical region.  He is never had any abdominal surgery in the past, no associated nausea vomiting or diarrhea/stool changes.  No changes of bladder habits, no evidence of hematuria, but does states he has a little bit of blood when he goes poop.  The patient denies any abdominal trauma, also denies any abdominal surgeries.  No recent travel, no history of blood clotting, no dietary changes, no fevers or chills, no recent URI type symptoms.  No history of kidney stone.  No penile discharge.  The patient denies any smoking, drug use, does states social drinker  The patient has no other acute complaints at this time.      REVIEW OF SYSTEMS   Review of Systems      PAST MEDICAL AND SURGICAL HISTORY   History reviewed. No pertinent past medical history.  Past Surgical History:   Procedure Laterality Date    LASIK           MEDICATIONS     Current Facility-Administered Medications:     famotidine (PEPCID) 20 MG/2ML 20 mg in sodium chloride (PF) 0.9 % 10 mL injection, 20 mg, IntraVENous, Once, Benitez Coy MD    aluminum & magnesium hydroxide-simethicone (MAALOX PLUS) 30 mL, lidocaine viscous hcl (XYLOCAINE) 5 mL (GI COCKTAIL), , Oral, Once, Benitez Coy MD    fentaNYL (SUBLIMAZE) injection 50 mcg, 50 mcg,

## 2025-05-13 NOTE — ANESTHESIA POSTPROCEDURE EVALUATION
Department of Anesthesiology  Postprocedure Note    Patient: Hieu Denny  MRN: [id number]  YOB: 1995  Date of evaluation: 5/13/2025    Procedure Summary       Date: 05/13/25 Room / Location: [facility] OR 12 / [facility] OR    Anesthesia Start: 1127 Anesthesia Stop: 1210    Procedure: APPENDECTOMY LAPAROSCOPIC (Abdomen) Diagnosis:       Abdominal pain, unspecified abdominal location      (Abdominal pain, unspecified abdominal location [R10.9])    Surgeons: Ajith Caballero MD Responsible Provider: Yanick Henriquez DO    Anesthesia Type: general ASA Status: 1 - Emergent            Anesthesia Type: No value filed.    Kalyn Phase I: Kalyn Score: 10    Kalyn Phase II:      Anesthesia Post Evaluation    Patient location during evaluation: PACU  Patient participation: complete - patient participated  Level of consciousness: awake and alert  Pain score: 3  Airway patency: patent  Nausea & Vomiting: no nausea and no vomiting  Cardiovascular status: hemodynamically stable and blood pressure returned to baseline  Respiratory status: spontaneous ventilation, acceptable and nasal cannula  Hydration status: stable  Pain management: adequate and satisfactory to patient    No notable events documented.

## 2025-05-13 NOTE — H&P
[facility]  General Surgery Consult Note  Dr. Ajith Caballero MD  Pt Name: Hieu Denny  MRN: [id number]  YOB: 1995  Date of evaluation: 5/13/2025  Primary Care Physician: No primary care provider on file.  Patient evaluated at the request of  Dr. Fink  Reason for evaluation: acute appendicitis  IMPRESSIONS:     Active Hospital Problems    Diagnosis Date Noted    Acute appendicitis with localized peritonitis, without perforation, abscess, or gangrene [K35.30] 05/13/2025     PLANS:   Admit observation  Broad-spectrum antibiotics  N.p.o.  Laparoscopic appendectomy when the OR is available  Last ate 12MN  SUBJECTIVE:   History of Chief Complaint:    Hieu Denny is a 29 y.o. male who presents with midepigastric periumbilical abdominal pain starting last evening about 7-8 o'clock. .  Tried some over-the-counter medications with no assistance bowels been moving normally denies any nausea.  Has was healthy prior to this.  Past Medical History  History reviewed. No pertinent past medical history.  Past Surgical History  Past Surgical History:   Procedure Laterality Date    LASIK       Medications  Prior to Admission medications    Not on File    Scheduled Meds:   sodium chloride  1,000 mL IntraVENous Once    metroNIDAZOLE  500 mg IntraVENous Once    cefTRIAXone (ROCEPHIN) IV  1,000 mg IntraVENous Once     Continuous Infusions:  PRN Meds:.  Allergies  Patient has no known allergies.  Family History  Family History   Problem Relation Age of Onset    Heart Attack Mother      Social History  Social History     Socioeconomic History    Marital status: Single     Spouse name: None    Number of children: None    Years of education: None    Highest education level: None   Tobacco Use    Smoking status: Never    Smokeless tobacco: Never   Vaping Use    Vaping status: Never Used   Substance and Sexual Activity    Alcohol use: Yes     Comment: occationally    Drug use:

## 2025-05-13 NOTE — OP NOTE
[facility]  Operative Report    PATIENT NAME: Hieu Denny  MEDICAL RECORD NO. [id number]  SURGEON: Ajith Caballero MD MD FACS  Primary Care Physician: No primary care provider on file.  Date: 5/13/2025, 11:57 AM     PROCEDURE PERFORMED: Laparoscopic Appendectomy   PREOPERATIVE DIAGNOSIS:   Active Hospital Problems    Diagnosis Date Noted    Acute appendicitis with localized peritonitis, without perforation, abscess, or gangrene [K35.30] 05/13/2025      POSTOPERATIVE DIAGNOSIS:   Active Hospital Problems    Diagnosis Date Noted    Acute appendicitis with perforation and localized peritonitis, without abscess or gangrene [K35.32] 05/13/2025      SURGEON:  Ajith Caballero MD MD FACS  ANESTHESIA:  General endotracheal anesthesia and local  ANESTHESIA: 0.5 % Marcaine in equal parts  20 mls used  ESTIMATED BLOOD LOSS:  0  ml  SPECIMEN:  Appendix  COMPLICATIONS:  None; patient tolerated the procedure well.  FINDINGS: The appendix was found to be inflamed.   There was not signs of necrosis.    There was very small perforation at the tip, identified during mesenteric division, only a few droops pus noted, suction irrigated out   There was not abscess formation  DRAINS: none  DISPOSITION: PACU - hemodynamically stable.  CONDITION: stable  Findings:  Infection Present At Time Of Surgery (PATOS) (choose all levels that have infection present):  - Organ Space infection (below fascia) present as evidenced by pus  Other Findings: Very small amount in the mesentery just near the tip of the appendage no generalizability or pus anywhere else        Indications: The patient presented with a history of right-sided abdominal pain. A CT scan revealed findings consistent with acute appendicitis.      Procedure Details     The patient was seen again in the Holding Room. The risks, benefits, complications, treatment options, and expected outcomes were discussed with the patient and/or family. The possibilities of

## 2025-05-13 NOTE — PROGRESS NOTES
Patient tolerating regular diet, ambulate 400 feet. No pain/N+V.   Discharge teaching and instructions for diagnosis/procedure of acute appendicitis completed with patient using teachback method. AVS reviewed. Printed prescriptions given to patient. Patient voiced understanding regarding prescriptions, follow up appointments, and care of self at home. Discharged ambulatory to  home with support per friend

## 2025-05-13 NOTE — PROGRESS NOTES
1208 pt arrived to PACU, awakens to voice and denies pain. VSS. Sites CDI x3.   1215 pt resting, awakens to voice and denies pain. VSS  1220 pt states pain 2/10 and tolerable. Denies need for pain medication at this time. VSS  1230 c/o pain 7/10, medicated with 0.5 mg dilaudid  1235 no change in pain status, medicated with 0.5 mg dilaudid  1240 pt states pain 2/10 and tolerable. VSS  1250 pt states pain 2/10 and tolerable. VSS  1255 meets criteria for discharge from PACU  1303 transported to 8AB27 in stable condition. No family to call per pt. Belongings sent to floor. Bag at end of bed and phone sent in bag on IV pole

## 2025-05-13 NOTE — ANESTHESIA PRE PROCEDURE
Department of Anesthesiology  Preprocedure Note       Name:  Hieu Denny   Age:  29 y.o.  :  1995                                          MRN:  [id number]         Date:  2025      Surgeon: Surgeon(s):  Ajith Caballero MD    Procedure: Procedure(s):  APPENDECTOMY LAPAROSCOPIC    Medications prior to admission:   Prior to Admission medications    Not on File       Current medications:    Current Facility-Administered Medications   Medication Dose Route Frequency Provider Last Rate Last Admin    lactated ringers infusion   IntraVENous Continuous Ajith Caballero MD        sodium chloride flush 0.9 % injection 5-40 mL  5-40 mL IntraVENous 2 times per day Ajith Caballreo MD        sodium chloride flush 0.9 % injection 5-40 mL  5-40 mL IntraVENous PRN Ajith Caballero MD        0.9 % sodium chloride infusion   IntraVENous PRN Ajith Caballero MD        ondansetron (ZOFRAN-ODT) disintegrating tablet 4 mg  4 mg Oral Q8H PRN Ajith Caballero MD        Or    ondansetron (ZOFRAN) injection 4 mg  4 mg IntraVENous Q6H PRN Ajith Caballero MD        HYDROmorphone (DILAUDID) injection 0.25 mg  0.25 mg IntraVENous Q3H PRN Ajith Caballero MD        Or    HYDROmorphone (DILAUDID) injection 0.5 mg  0.5 mg IntraVENous Q3H PRN Ajith Caballero MD         Facility-Administered Medications Ordered in Other Encounters   Medication Dose Route Frequency Provider Last Rate Last Admin    fentaNYL (SUBLIMAZE) injection   IntraVENous Once PRN Digna, Rosalba APRN - CRNA   50 mcg at 25 1131    midazolam (VERSED) injection   IntraVENous Once PRN Digna, Rosalba, APRN - CRNA   2 mg at 25 1125    rocuronium (ZEMURON) injection   IntraVENous Once PRN Digna, Rosalba, APRN - CRNA   50 mg at 25 1131    propofol infusion   IntraVENous Once PRN Digna, Rosalba, APRN - CRNA   200 mg at 25 1131    lidocaine (cardiac) (XYLOCAINE) injection   IntraVENous Once PRN Digna, Rosalba APRN - CRNA

## 2025-05-13 NOTE — PROGRESS NOTES
Phone and patient belongs sticker taped together, placed in a small clear bag and hung on IV pole of inpatient bed. Transferred with bed and belongings to pacu.

## 2025-05-14 ENCOUNTER — TELEPHONE (OUTPATIENT)
Dept: SURGERY | Age: 30
End: 2025-05-14

## 2025-05-14 NOTE — TELEPHONE ENCOUNTER
Pt called back and states he is a little sore, but otherwise doing fine.  He was advised to use ice, take stool softener if using pain pills and move as tolerable.  He will call if he has any problems.

## 2025-05-26 NOTE — PROGRESS NOTES
[facility]    Ajith Caballero MD [facility]  General Surgery  Postprocedure Evaluation in Office  Pt Name: Hieu Denny  Date of Birth 1995   Today's Date: 5/28/2025  Medical Record Number: [id number]  Referring Provider: No ref. provider found  Primary Care Provider: No primary care provider on file.  Chief Complaint   Patient presents with    Post-Op Check     S/P Laparoscopic Appendectomy 5/13/25     ASSESSMENT      Problem List Items Addressed This Visit       S/P laparoscopic appendectomy - Primary        PLANS   Assessment & Plan    Pathology reviewed with the patient who understands. All questions were answered.  Patient Instructions   May return to full activity without restrictions.      Follow up: Return if symptoms worsen or fail to improve.  Orders Placed This Encounter:  No orders of the defined types were placed in this encounter.        RAKESH Hagen is seen today for post-op follow-up. He is 2 week(s) status post laparoscopic appendectomy. He is tolerating a regular diet, having regular bowel movements. Symptoms and activity have gradually improved compared to preoperative. The surgical site is clean and has no drainage. Pain is controlled without any medications.. He has compliant with postoperative instructions.  Past Medical History  History reviewed. No pertinent past medical history.  Past Surgical History  Past Surgical History:   Procedure Laterality Date    LAPAROSCOPIC APPENDECTOMY N/A 5/13/2025    APPENDECTOMY LAPAROSCOPIC performed by Ajith Caballero MD at [facility] OR    [facility]       Medications  No current outpatient medications on file prior to visit.     No current facility-administered medications on file prior to visit.     Allergies  No Known Allergies  Social History  Social History     Socioeconomic History    Marital status: Single     Spouse name: Not on file    Number of children: Not on file    Years of education: Not on file    Highest education

## 2025-05-28 ENCOUNTER — OFFICE VISIT (OUTPATIENT)
Dept: SURGERY | Age: 30
End: 2025-05-28

## 2025-05-28 VITALS
SYSTOLIC BLOOD PRESSURE: 116 MMHG | OXYGEN SATURATION: 98 % | HEIGHT: 68 IN | HEART RATE: 80 BPM | DIASTOLIC BLOOD PRESSURE: 74 MMHG | BODY MASS INDEX: 27.28 KG/M2 | RESPIRATION RATE: 18 BRPM | TEMPERATURE: 98.5 F | WEIGHT: 180 LBS

## 2025-05-28 DIAGNOSIS — Z90.49 S/P LAPAROSCOPIC APPENDECTOMY: Primary | ICD-10-CM

## 2025-05-28 PROCEDURE — 99024 POSTOP FOLLOW-UP VISIT: CPT | Performed by: SURGERY

## (undated) DEVICE — TROCAR: Brand: KII FIOS FIRST ENTRY

## (undated) DEVICE — CLIP INT XL YEL POLYMER HEM-O-LOK WECK: Type: IMPLANTABLE DEVICE | Site: ABDOMEN | Status: NON-FUNCTIONAL

## (undated) DEVICE — SUTURE VICRYL + SZ 2-0 L27IN ABSRB VLT UR-6 5/8 CIR TAPR PNT VCP602H

## (undated) DEVICE — BAG RETRIEVAL SPECIMEN SUPERBAG 5 SMALL NYLON ITRODUCER

## (undated) DEVICE — PUMP SUC IRR TBNG L10FT W/ HNDPC ASSEMB STRYKEFLOW 2

## (undated) DEVICE — LIQUIBAND RAPID ADHESIVE 36/CS 0.8ML: Brand: MEDLINE

## (undated) DEVICE — TROCAR: Brand: KII® SLEEVE

## (undated) DEVICE — DISSECTOR ULTRASONIC SONICISION 7 5MM 39CM CURVED JAW CORDLESS

## (undated) DEVICE — UNIVERSAL MONOPOLAR LAPAROSCOPIC CABLE 10FT, 4MM PIN CONNECTOR: Brand: CONMED

## (undated) DEVICE — GLOVE ORANGE PI 7 1/2   MSG9075

## (undated) DEVICE — TUBING INSUFFLATION SMK EVAC HI FLO SET PNEUMOCLEAR

## (undated) DEVICE — GENERAL LAPAROSCOPY-LF: Brand: MEDLINE INDUSTRIES, INC.